# Patient Record
Sex: FEMALE | Race: WHITE | NOT HISPANIC OR LATINO | Employment: OTHER | URBAN - METROPOLITAN AREA
[De-identification: names, ages, dates, MRNs, and addresses within clinical notes are randomized per-mention and may not be internally consistent; named-entity substitution may affect disease eponyms.]

---

## 2017-02-06 ENCOUNTER — TRANSCRIBE ORDERS (OUTPATIENT)
Dept: ADMINISTRATIVE | Facility: HOSPITAL | Age: 52
End: 2017-02-06

## 2017-02-06 DIAGNOSIS — Z12.31 VISIT FOR SCREENING MAMMOGRAM: Primary | ICD-10-CM

## 2017-02-24 ENCOUNTER — HOSPITAL ENCOUNTER (OUTPATIENT)
Dept: MAMMOGRAPHY | Facility: HOSPITAL | Age: 52
Discharge: HOME | End: 2017-02-24
Admitting: INTERNAL MEDICINE

## 2017-02-24 ENCOUNTER — APPOINTMENT (OUTPATIENT)
Dept: MAMMOGRAPHY | Facility: HOSPITAL | Age: 52
End: 2017-02-24

## 2017-02-24 DIAGNOSIS — Z12.31 VISIT FOR SCREENING MAMMOGRAM: ICD-10-CM

## 2017-02-24 PROCEDURE — 77063 BREAST TOMOSYNTHESIS BI: CPT | Performed by: RADIOLOGY

## 2017-02-24 PROCEDURE — G0202 SCR MAMMO BI INCL CAD: HCPCS | Performed by: RADIOLOGY

## 2017-02-24 PROCEDURE — G0202 SCR MAMMO BI INCL CAD: HCPCS

## 2017-02-24 PROCEDURE — 77063 BREAST TOMOSYNTHESIS BI: CPT

## 2017-03-07 PROBLEM — Z01.419 WELL WOMAN EXAM WITH ROUTINE GYNECOLOGICAL EXAM: Chronic | Status: ACTIVE | Noted: 2017-03-07

## 2018-03-27 ENCOUNTER — TRANSCRIBE ORDERS (OUTPATIENT)
Dept: ADMINISTRATIVE | Facility: HOSPITAL | Age: 53
End: 2018-03-27

## 2018-03-27 DIAGNOSIS — N64.4 BREAST PAIN: ICD-10-CM

## 2018-03-27 DIAGNOSIS — N63.11 BREAST LUMP ON RIGHT SIDE AT 10 O'CLOCK POSITION: Primary | ICD-10-CM

## 2018-04-06 ENCOUNTER — HOSPITAL ENCOUNTER (OUTPATIENT)
Dept: ULTRASOUND IMAGING | Facility: HOSPITAL | Age: 53
Discharge: HOME | End: 2018-04-06

## 2018-04-06 ENCOUNTER — APPOINTMENT (OUTPATIENT)
Dept: MAMMOGRAPHY | Facility: HOSPITAL | Age: 53
End: 2018-04-06

## 2018-04-06 ENCOUNTER — HOSPITAL ENCOUNTER (OUTPATIENT)
Dept: MAMMOGRAPHY | Facility: HOSPITAL | Age: 53
Discharge: HOME | End: 2018-04-06
Admitting: INTERNAL MEDICINE

## 2018-04-06 DIAGNOSIS — N63.11 BREAST LUMP ON RIGHT SIDE AT 10 O'CLOCK POSITION: ICD-10-CM

## 2018-04-06 DIAGNOSIS — N64.4 BREAST PAIN: ICD-10-CM

## 2018-04-06 PROCEDURE — G0279 TOMOSYNTHESIS, MAMMO: HCPCS | Performed by: RADIOLOGY

## 2018-04-06 PROCEDURE — 77066 DX MAMMO INCL CAD BI: CPT

## 2018-04-06 PROCEDURE — 76642 ULTRASOUND BREAST LIMITED: CPT

## 2018-04-06 PROCEDURE — G0279 TOMOSYNTHESIS, MAMMO: HCPCS

## 2018-04-06 PROCEDURE — 76642 ULTRASOUND BREAST LIMITED: CPT | Performed by: RADIOLOGY

## 2018-04-06 PROCEDURE — 77066 DX MAMMO INCL CAD BI: CPT | Performed by: RADIOLOGY

## 2019-04-17 ENCOUNTER — TRANSCRIBE ORDERS (OUTPATIENT)
Dept: ADMINISTRATIVE | Facility: HOSPITAL | Age: 54
End: 2019-04-17

## 2019-04-17 DIAGNOSIS — R92.8 ABNORMAL MAMMOGRAM: Primary | ICD-10-CM

## 2019-07-31 ENCOUNTER — APPOINTMENT (OUTPATIENT)
Dept: MAMMOGRAPHY | Facility: HOSPITAL | Age: 54
End: 2019-07-31

## 2019-08-15 ENCOUNTER — APPOINTMENT (OUTPATIENT)
Dept: MAMMOGRAPHY | Facility: HOSPITAL | Age: 54
End: 2019-08-15

## 2019-10-21 ENCOUNTER — APPOINTMENT (OUTPATIENT)
Dept: MAMMOGRAPHY | Facility: HOSPITAL | Age: 54
End: 2019-10-21

## 2020-01-28 ENCOUNTER — APPOINTMENT (OUTPATIENT)
Dept: MAMMOGRAPHY | Facility: HOSPITAL | Age: 55
End: 2020-01-28

## 2020-04-07 ENCOUNTER — APPOINTMENT (OUTPATIENT)
Dept: MAMMOGRAPHY | Facility: HOSPITAL | Age: 55
End: 2020-04-07

## 2021-03-02 ENCOUNTER — TRANSCRIBE ORDERS (OUTPATIENT)
Dept: ADMINISTRATIVE | Facility: HOSPITAL | Age: 56
End: 2021-03-02

## 2021-03-02 DIAGNOSIS — R92.8 ABNORMAL MAMMOGRAM: Primary | ICD-10-CM

## 2021-03-02 DIAGNOSIS — Z12.31 ENCOUNTER FOR SCREENING MAMMOGRAM FOR MALIGNANT NEOPLASM OF BREAST: Primary | ICD-10-CM

## 2021-04-20 ENCOUNTER — HOSPITAL ENCOUNTER (OUTPATIENT)
Dept: MAMMOGRAPHY | Facility: HOSPITAL | Age: 56
End: 2021-04-20

## 2021-07-06 ENCOUNTER — APPOINTMENT (OUTPATIENT)
Dept: MAMMOGRAPHY | Facility: HOSPITAL | Age: 56
End: 2021-07-06

## 2021-07-24 ENCOUNTER — HOSPITAL ENCOUNTER (OUTPATIENT)
Dept: MAMMOGRAPHY | Facility: HOSPITAL | Age: 56
Discharge: HOME | End: 2021-07-24
Admitting: INTERNAL MEDICINE

## 2021-07-24 DIAGNOSIS — Z12.31 ENCOUNTER FOR SCREENING MAMMOGRAM FOR MALIGNANT NEOPLASM OF BREAST: ICD-10-CM

## 2021-07-24 PROCEDURE — 77063 BREAST TOMOSYNTHESIS BI: CPT | Performed by: RADIOLOGY

## 2021-07-24 PROCEDURE — 77067 SCR MAMMO BI INCL CAD: CPT | Performed by: RADIOLOGY

## 2021-07-24 PROCEDURE — 77067 SCR MAMMO BI INCL CAD: CPT

## 2021-07-24 PROCEDURE — 77063 BREAST TOMOSYNTHESIS BI: CPT

## 2021-07-29 ENCOUNTER — OFFICE VISIT (OUTPATIENT)
Dept: ORTHOPEDIC SURGERY | Facility: CLINIC | Age: 56
End: 2021-07-29

## 2021-07-29 VITALS
BODY MASS INDEX: 28.72 KG/M2 | HEART RATE: 62 BPM | DIASTOLIC BLOOD PRESSURE: 69 MMHG | HEIGHT: 67 IN | SYSTOLIC BLOOD PRESSURE: 143 MMHG | WEIGHT: 183 LBS

## 2021-07-29 DIAGNOSIS — R20.0 NUMBNESS AND TINGLING OF RIGHT UPPER EXTREMITY: Primary | ICD-10-CM

## 2021-07-29 DIAGNOSIS — R20.2 NUMBNESS AND TINGLING OF RIGHT UPPER EXTREMITY: Primary | ICD-10-CM

## 2021-07-29 DIAGNOSIS — Z77.22 SECONDHAND SMOKE EXPOSURE: ICD-10-CM

## 2021-07-29 DIAGNOSIS — Z98.890 HISTORY OF CARPAL TUNNEL RELEASE: ICD-10-CM

## 2021-07-29 DIAGNOSIS — M79.601 PAIN OF RIGHT UPPER EXTREMITY: ICD-10-CM

## 2021-07-29 DIAGNOSIS — E11.65 TYPE 2 DIABETES MELLITUS WITH HYPERGLYCEMIA, WITHOUT LONG-TERM CURRENT USE OF INSULIN (HCC): ICD-10-CM

## 2021-07-29 PROCEDURE — 99204 OFFICE O/P NEW MOD 45 MIN: CPT | Performed by: PHYSICIAN ASSISTANT

## 2021-07-29 RX ORDER — FENOFIBRATE 200 MG/1
145 CAPSULE ORAL
COMMUNITY
Start: 2021-07-02

## 2021-07-29 RX ORDER — FLUCONAZOLE 150 MG/1
TABLET ORAL
COMMUNITY
End: 2022-11-16 | Stop reason: HOSPADM

## 2021-07-29 RX ORDER — ALPRAZOLAM 1 MG/1
1 TABLET ORAL 3 TIMES DAILY PRN
COMMUNITY
Start: 2021-06-24

## 2021-07-29 RX ORDER — GABAPENTIN 800 MG/1
800 TABLET ORAL 3 TIMES DAILY
COMMUNITY
Start: 2021-06-29

## 2021-07-29 RX ORDER — TRAMADOL HYDROCHLORIDE 50 MG/1
50 TABLET ORAL EVERY 8 HOURS PRN
COMMUNITY
Start: 2021-06-24

## 2021-07-29 RX ORDER — GLIMEPIRIDE 1 MG/1
2 TABLET ORAL 2 TIMES DAILY
COMMUNITY
Start: 2021-05-15

## 2021-07-29 RX ORDER — FAMOTIDINE 40 MG/1
40 TABLET, FILM COATED ORAL 2 TIMES DAILY
COMMUNITY
Start: 2021-06-22

## 2021-07-29 RX ORDER — LOSARTAN POTASSIUM 100 MG/1
100 TABLET ORAL DAILY
COMMUNITY
Start: 2021-05-30

## 2021-07-29 RX ORDER — ROSUVASTATIN CALCIUM 5 MG/1
TABLET, COATED ORAL
COMMUNITY
Start: 2021-05-06

## 2021-07-29 RX ORDER — HYDROCODONE BITARTRATE AND ACETAMINOPHEN 10; 325 MG/1; MG/1
1 TABLET ORAL EVERY 8 HOURS PRN
COMMUNITY
Start: 2021-06-24
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
## 2022-08-09 ENCOUNTER — TRANSCRIBE ORDERS (OUTPATIENT)
Dept: ADMINISTRATIVE | Facility: HOSPITAL | Age: 57
End: 2022-08-09

## 2022-08-09 DIAGNOSIS — Z12.31 VISIT FOR SCREENING MAMMOGRAM: Primary | ICD-10-CM

## 2022-08-15 ENCOUNTER — APPOINTMENT (OUTPATIENT)
Dept: MAMMOGRAPHY | Facility: HOSPITAL | Age: 57
End: 2022-08-15
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
## 2022-11-16 ENCOUNTER — HOSPITAL ENCOUNTER (OUTPATIENT)
Facility: HOSPITAL | Age: 57
Setting detail: HOSPITAL OUTPATIENT SURGERY
Discharge: HOME | End: 2022-11-16
Attending: UROLOGY | Admitting: UROLOGY

## 2022-11-16 ENCOUNTER — ANESTHESIA (OUTPATIENT)
Dept: PERIOP | Facility: HOSPITAL | Age: 57
End: 2022-11-16

## 2022-11-16 ENCOUNTER — APPOINTMENT (OUTPATIENT)
Dept: GENERAL RADIOLOGY | Facility: HOSPITAL | Age: 57
End: 2022-11-16

## 2022-11-16 ENCOUNTER — ANESTHESIA EVENT (OUTPATIENT)
Dept: PERIOP | Facility: HOSPITAL | Age: 57
End: 2022-11-16

## 2022-11-16 VITALS
HEART RATE: 72 BPM | BODY MASS INDEX: 29.19 KG/M2 | OXYGEN SATURATION: 94 % | HEIGHT: 67 IN | SYSTOLIC BLOOD PRESSURE: 118 MMHG | RESPIRATION RATE: 16 BRPM | WEIGHT: 186 LBS | TEMPERATURE: 98.7 F | DIASTOLIC BLOOD PRESSURE: 67 MMHG

## 2022-11-16 DIAGNOSIS — R10.2 PELVIC PAIN IN FEMALE: ICD-10-CM

## 2022-11-16 LAB
ANION GAP SERPL CALCULATED.3IONS-SCNC: 11 MMOL/L (ref 5–15)
BUN SERPL-MCNC: 38 MG/DL (ref 6–20)
BUN/CREAT SERPL: 18.7 (ref 7–25)
CALCIUM SPEC-SCNC: 9.9 MG/DL (ref 8.6–10.5)
CHLORIDE SERPL-SCNC: 98 MMOL/L (ref 98–107)
CO2 SERPL-SCNC: 24 MMOL/L (ref 22–29)
CREAT SERPL-MCNC: 2.03 MG/DL (ref 0.57–1)
DEPRECATED RDW RBC AUTO: 60 FL (ref 37–54)
EGFRCR SERPLBLD CKD-EPI 2021: 28 ML/MIN/1.73
ERYTHROCYTE [DISTWIDTH] IN BLOOD BY AUTOMATED COUNT: 15.9 % (ref 12.3–15.4)
GLUCOSE BLDC GLUCOMTR-MCNC: 255 MG/DL (ref 70–130)
GLUCOSE SERPL-MCNC: 262 MG/DL (ref 65–99)
HCT VFR BLD AUTO: 38.5 % (ref 34–46.6)
HGB BLD-MCNC: 12.7 G/DL (ref 12–15.9)
MCH RBC QN AUTO: 34 PG (ref 26.6–33)
MCHC RBC AUTO-ENTMCNC: 33 G/DL (ref 31.5–35.7)
MCV RBC AUTO: 103.2 FL (ref 79–97)
PLATELET # BLD AUTO: 282 10*3/MM3 (ref 140–450)
PMV BLD AUTO: 10.1 FL (ref 6–12)
POTASSIUM SERPL-SCNC: 4.5 MMOL/L (ref 3.5–5.2)
RBC # BLD AUTO: 3.73 10*6/MM3 (ref 3.77–5.28)
SODIUM SERPL-SCNC: 133 MMOL/L (ref 136–145)
WBC NRBC COR # BLD: 12.01 10*3/MM3 (ref 3.4–10.8)

## 2022-11-16 PROCEDURE — 74420 UROGRAPHY RTRGR +-KUB: CPT

## 2022-11-16 PROCEDURE — 25010000002 CEFAZOLIN IN DEXTROSE 2-4 GM/100ML-% SOLUTION: Performed by: UROLOGY

## 2022-11-16 PROCEDURE — 25010000002 IOPAMIDOL 61 % SOLUTION: Performed by: UROLOGY

## 2022-11-16 PROCEDURE — 82962 GLUCOSE BLOOD TEST: CPT

## 2022-11-16 PROCEDURE — 25010000002 FENTANYL CITRATE (PF) 50 MCG/ML SOLUTION

## 2022-11-16 PROCEDURE — C1758 CATHETER, URETERAL: HCPCS | Performed by: UROLOGY

## 2022-11-16 PROCEDURE — C1769 GUIDE WIRE: HCPCS | Performed by: UROLOGY

## 2022-11-16 PROCEDURE — 87086 URINE CULTURE/COLONY COUNT: CPT | Performed by: UROLOGY

## 2022-11-16 PROCEDURE — 25010000002 PROPOFOL 10 MG/ML EMULSION: Performed by: NURSE ANESTHETIST, CERTIFIED REGISTERED

## 2022-11-16 PROCEDURE — 25010000002 DEXAMETHASONE PER 1 MG: Performed by: NURSE ANESTHETIST, CERTIFIED REGISTERED

## 2022-11-16 PROCEDURE — 25010000002 HYDROMORPHONE 1 MG/ML SOLUTION

## 2022-11-16 PROCEDURE — 25010000002 FENTANYL CITRATE (PF) 100 MCG/2ML SOLUTION: Performed by: NURSE ANESTHETIST, CERTIFIED REGISTERED

## 2022-11-16 PROCEDURE — 25010000002 ONDANSETRON PER 1 MG: Performed by: NURSE ANESTHETIST, CERTIFIED REGISTERED

## 2022-11-16 PROCEDURE — 85027 COMPLETE CBC AUTOMATED: CPT | Performed by: UROLOGY

## 2022-11-16 PROCEDURE — 88305 TISSUE EXAM BY PATHOLOGIST: CPT | Performed by: UROLOGY

## 2022-11-16 PROCEDURE — 80048 BASIC METABOLIC PNL TOTAL CA: CPT | Performed by: UROLOGY

## 2022-11-16 RX ORDER — CEPHALEXIN 250 MG/1
250 CAPSULE ORAL 3 TIMES DAILY
Qty: 15 CAPSULE | Refills: 0 | Status: SHIPPED | OUTPATIENT
Start: 2022-11-16 | End: 2022-11-21

## 2022-11-16 RX ORDER — GENTAMICIN SULFATE 80 MG/100ML
80 INJECTION, SOLUTION INTRAVENOUS ONCE
Status: DISCONTINUED | OUTPATIENT
Start: 2022-11-16 | End: 2022-11-16 | Stop reason: HOSPADM

## 2022-11-16 RX ORDER — HYDRALAZINE HYDROCHLORIDE 20 MG/ML
5 INJECTION INTRAMUSCULAR; INTRAVENOUS
Status: DISCONTINUED | OUTPATIENT
Start: 2022-11-16 | End: 2022-11-16 | Stop reason: HOSPADM

## 2022-11-16 RX ORDER — MIDAZOLAM HYDROCHLORIDE 1 MG/ML
1 INJECTION INTRAMUSCULAR; INTRAVENOUS
Status: DISCONTINUED | OUTPATIENT
Start: 2022-11-16 | End: 2022-11-16 | Stop reason: HOSPADM

## 2022-11-16 RX ORDER — SODIUM CHLORIDE, SODIUM LACTATE, POTASSIUM CHLORIDE, CALCIUM CHLORIDE 600; 310; 30; 20 MG/100ML; MG/100ML; MG/100ML; MG/100ML
9 INJECTION, SOLUTION INTRAVENOUS CONTINUOUS PRN
Status: DISCONTINUED | OUTPATIENT
Start: 2022-11-16 | End: 2022-11-16 | Stop reason: HOSPADM

## 2022-11-16 RX ORDER — DEXAMETHASONE SODIUM PHOSPHATE 4 MG/ML
INJECTION, SOLUTION INTRA-ARTICULAR; INTRALESIONAL; INTRAMUSCULAR; INTRAVENOUS; SOFT TISSUE AS NEEDED
Status: DISCONTINUED | OUTPATIENT
Start: 2022-11-16 | End: 2022-11-16 | Stop reason: SURG

## 2022-11-16 RX ORDER — SODIUM CHLORIDE 0.9 % (FLUSH) 0.9 %
10 SYRINGE (ML) INJECTION AS NEEDED
Status: DISCONTINUED | OUTPATIENT
Start: 2022-11-16 | End: 2022-11-16 | Stop reason: HOSPADM

## 2022-11-16 RX ORDER — HYDROCODONE BITARTRATE AND ACETAMINOPHEN 5; 325 MG/1; MG/1
1 TABLET ORAL ONCE AS NEEDED
Status: DISCONTINUED | OUTPATIENT
Start: 2022-11-16 | End: 2022-11-16 | Stop reason: HOSPADM

## 2022-11-16 RX ORDER — NALOXONE HCL 0.4 MG/ML
0.4 VIAL (ML) INJECTION AS NEEDED
Status: DISCONTINUED | OUTPATIENT
Start: 2022-11-16 | End: 2022-11-16 | Stop reason: HOSPADM

## 2022-11-16 RX ORDER — FENTANYL CITRATE 50 UG/ML
INJECTION, SOLUTION INTRAMUSCULAR; INTRAVENOUS
Status: COMPLETED
Start: 2022-11-16 | End: 2022-11-16

## 2022-11-16 RX ORDER — CEFAZOLIN SODIUM 2 G/100ML
2 INJECTION, SOLUTION INTRAVENOUS ONCE
Status: COMPLETED | OUTPATIENT
Start: 2022-11-16 | End: 2022-11-16

## 2022-11-16 RX ORDER — ONDANSETRON 2 MG/ML
4 INJECTION INTRAMUSCULAR; INTRAVENOUS ONCE AS NEEDED
Status: DISCONTINUED | OUTPATIENT
Start: 2022-11-16 | End: 2022-11-16 | Stop reason: HOSPADM

## 2022-11-16 RX ORDER — SODIUM CHLORIDE 0.9 % (FLUSH) 0.9 %
3 SYRINGE (ML) INJECTION EVERY 12 HOURS SCHEDULED
Status: DISCONTINUED | OUTPATIENT
Start: 2022-11-16 | End: 2022-11-16 | Stop reason: HOSPADM

## 2022-11-16 RX ORDER — MAGNESIUM HYDROXIDE 1200 MG/15ML
LIQUID ORAL AS NEEDED
Status: DISCONTINUED | OUTPATIENT
Start: 2022-11-16 | End: 2022-11-16 | Stop reason: HOSPADM

## 2022-11-16 RX ORDER — HYDROMORPHONE HYDROCHLORIDE 1 MG/ML
0.5 INJECTION, SOLUTION INTRAMUSCULAR; INTRAVENOUS; SUBCUTANEOUS
Status: DISCONTINUED | OUTPATIENT
Start: 2022-11-16 | End: 2022-11-16 | Stop reason: HOSPADM

## 2022-11-16 RX ORDER — PROMETHAZINE HYDROCHLORIDE 25 MG/1
25 SUPPOSITORY RECTAL ONCE AS NEEDED
Status: DISCONTINUED | OUTPATIENT
Start: 2022-11-16 | End: 2022-11-16 | Stop reason: HOSPADM

## 2022-11-16 RX ORDER — SODIUM CHLORIDE 0.9 % (FLUSH) 0.9 %
10 SYRINGE (ML) INJECTION EVERY 12 HOURS SCHEDULED
Status: DISCONTINUED | OUTPATIENT
Start: 2022-11-16 | End: 2022-11-16 | Stop reason: HOSPADM

## 2022-11-16 RX ORDER — LIDOCAINE HYDROCHLORIDE 20 MG/ML
JELLY TOPICAL AS NEEDED
Status: DISCONTINUED | OUTPATIENT
Start: 2022-11-16 | End: 2022-11-16 | Stop reason: HOSPADM

## 2022-11-16 RX ORDER — PROPOFOL 10 MG/ML
VIAL (ML) INTRAVENOUS AS NEEDED
Status: DISCONTINUED | OUTPATIENT
Start: 2022-11-16 | End: 2022-11-16 | Stop reason: SURG

## 2022-11-16 RX ORDER — SODIUM CHLORIDE 0.9 % (FLUSH) 0.9 %
3-10 SYRINGE (ML) INJECTION AS NEEDED
Status: DISCONTINUED | OUTPATIENT
Start: 2022-11-16 | End: 2022-11-16 | Stop reason: HOSPADM

## 2022-11-16 RX ORDER — FENTANYL CITRATE 50 UG/ML
INJECTION, SOLUTION INTRAMUSCULAR; INTRAVENOUS AS NEEDED
Status: DISCONTINUED | OUTPATIENT
Start: 2022-11-16 | End: 2022-11-16 | Stop reason: SURG

## 2022-11-16 RX ORDER — IPRATROPIUM BROMIDE AND ALBUTEROL SULFATE 2.5; .5 MG/3ML; MG/3ML
3 SOLUTION RESPIRATORY (INHALATION) ONCE AS NEEDED
Status: DISCONTINUED | OUTPATIENT
Start: 2022-11-16 | End: 2022-11-16 | Stop reason: HOSPADM

## 2022-11-16 RX ORDER — DROPERIDOL 2.5 MG/ML
0.62 INJECTION, SOLUTION INTRAMUSCULAR; INTRAVENOUS ONCE AS NEEDED
Status: DISCONTINUED | OUTPATIENT
Start: 2022-11-16 | End: 2022-11-16 | Stop reason: HOSPADM

## 2022-11-16 RX ORDER — PHENYLEPHRINE HCL IN 0.9% NACL 1 MG/10 ML
SYRINGE (ML) INTRAVENOUS AS NEEDED
Status: DISCONTINUED | OUTPATIENT
Start: 2022-11-16 | End: 2022-11-16 | Stop reason: SURG

## 2022-11-16 RX ORDER — DROPERIDOL 2.5 MG/ML
0.62 INJECTION, SOLUTION INTRAMUSCULAR; INTRAVENOUS
Status: DISCONTINUED | OUTPATIENT
Start: 2022-11-16 | End: 2022-11-16 | Stop reason: HOSPADM

## 2022-11-16 RX ORDER — FENTANYL CITRATE 50 UG/ML
50 INJECTION, SOLUTION INTRAMUSCULAR; INTRAVENOUS
Status: DISCONTINUED | OUTPATIENT
Start: 2022-11-16 | End: 2022-11-16 | Stop reason: HOSPADM

## 2022-11-16 RX ORDER — LIDOCAINE HYDROCHLORIDE 10 MG/ML
INJECTION, SOLUTION EPIDURAL; INFILTRATION; INTRACAUDAL; PERINEURAL AS NEEDED
Status: DISCONTINUED | OUTPATIENT
Start: 2022-11-16 | End: 2022-11-16 | Stop reason: SURG

## 2022-11-16 RX ORDER — EPHEDRINE SULFATE 50 MG/ML
INJECTION INTRAVENOUS AS NEEDED
Status: DISCONTINUED | OUTPATIENT
Start: 2022-11-16 | End: 2022-11-16 | Stop reason: SURG

## 2022-11-16 RX ORDER — ONDANSETRON 2 MG/ML
INJECTION INTRAMUSCULAR; INTRAVENOUS AS NEEDED
Status: DISCONTINUED | OUTPATIENT
Start: 2022-11-16 | End: 2022-11-16 | Stop reason: SURG

## 2022-11-16 RX ORDER — FAMOTIDINE 20 MG/1
20 TABLET, FILM COATED ORAL
Status: COMPLETED | OUTPATIENT
Start: 2022-11-16 | End: 2022-11-16

## 2022-11-16 RX ORDER — LIDOCAINE HYDROCHLORIDE 10 MG/ML
0.5 INJECTION, SOLUTION EPIDURAL; INFILTRATION; INTRACAUDAL; PERINEURAL ONCE AS NEEDED
Status: COMPLETED | OUTPATIENT
Start: 2022-11-16 | End: 2022-11-16

## 2022-11-16 RX ORDER — PROMETHAZINE HYDROCHLORIDE 25 MG/1
25 TABLET ORAL ONCE AS NEEDED
Status: DISCONTINUED | OUTPATIENT
Start: 2022-11-16 | End: 2022-11-16 | Stop reason: HOSPADM

## 2022-11-16 RX ORDER — LABETALOL HYDROCHLORIDE 5 MG/ML
5 INJECTION, SOLUTION INTRAVENOUS
Status: DISCONTINUED | OUTPATIENT
Start: 2022-11-16 | End: 2022-11-16 | Stop reason: HOSPADM

## 2022-11-16 RX ADMIN — FENTANYL CITRATE 50 MCG: 50 INJECTION, SOLUTION INTRAMUSCULAR; INTRAVENOUS at 12:25

## 2022-11-16 RX ADMIN — EPHEDRINE SULFATE 10 MG: 50 INJECTION INTRAVENOUS at 11:56

## 2022-11-16 RX ADMIN — DEXAMETHASONE SODIUM PHOSPHATE 8 MG: 4 INJECTION, SOLUTION INTRAMUSCULAR; INTRAVENOUS at 11:21

## 2022-11-16 RX ADMIN — FENTANYL CITRATE 50 MCG: 50 INJECTION, SOLUTION INTRAMUSCULAR; INTRAVENOUS at 12:33

## 2022-11-16 RX ADMIN — Medication 100 MCG: at 11:25

## 2022-11-16 RX ADMIN — LIDOCAINE HYDROCHLORIDE 40 MG: 10 INJECTION, SOLUTION EPIDURAL; INFILTRATION; INTRACAUDAL; PERINEURAL at 11:18

## 2022-11-16 RX ADMIN — LIDOCAINE HYDROCHLORIDE 0.5 ML: 10 INJECTION, SOLUTION EPIDURAL; INFILTRATION; INTRACAUDAL; PERINEURAL at 09:25

## 2022-11-16 RX ADMIN — HYDROMORPHONE HYDROCHLORIDE 0.5 MG: 1 INJECTION, SOLUTION INTRAMUSCULAR; INTRAVENOUS; SUBCUTANEOUS at 12:48

## 2022-11-16 RX ADMIN — FENTANYL CITRATE 100 MCG: 50 INJECTION, SOLUTION INTRAMUSCULAR; INTRAVENOUS at 11:18

## 2022-11-16 RX ADMIN — FAMOTIDINE 20 MG: 20 TABLET, FILM COATED ORAL at 09:25

## 2022-11-16 RX ADMIN — CEFAZOLIN SODIUM 2 G: 2 INJECTION, SOLUTION INTRAVENOUS at 10:18

## 2022-11-16 RX ADMIN — ONDANSETRON 4 MG: 2 INJECTION INTRAMUSCULAR; INTRAVENOUS at 11:43

## 2022-11-16 RX ADMIN — EPHEDRINE SULFATE 10 MG: 50 INJECTION INTRAVENOUS at 11:48

## 2022-11-16 RX ADMIN — PROPOFOL 250 MG: 10 INJECTION, EMULSION INTRAVENOUS at 11:18

## 2022-11-16 RX ADMIN — SODIUM CHLORIDE, POTASSIUM CHLORIDE, SODIUM LACTATE AND CALCIUM CHLORIDE 9 ML/HR: 600; 310; 30; 20 INJECTION, SOLUTION INTRAVENOUS at 09:25

## 2022-11-16 NOTE — ANESTHESIA PROCEDURE NOTES
Airway  Urgency: elective    Date/Time: 11/16/2022 11:19 AM  Airway not difficult    General Information and Staff    Patient location during procedure: OR  Anesthesiologist: Sagar Alvarez MD  CRNA/CAA: Telma Mullen CRNA  SRNA: Aga Figueroa SRNA  Indications and Patient Condition  Indications for airway management: airway protection    Preoxygenated: yes  MILS maintained throughout  Mask difficulty assessment: 0 - not attempted    Final Airway Details  Final airway type: supraglottic airway      Successful airway: I-gel  Size 4     Assessment: lips, teeth, and gum same as pre-op and atraumatic intubation

## 2022-11-16 NOTE — ANESTHESIA POSTPROCEDURE EVALUATION
Patient: Shawanda Jeronimo    Procedure Summary     Date: 11/16/22 Room / Location:  [facility] /  [facility]    Anesthesia Start: 1110 Anesthesia Stop: 1157    Procedure: CYSTOSCOPY, RETROGRADE PYLEOGRAMS WITH BLADDER BIOPSY (Bladder) Diagnosis:     Surgeons: Anibal Thomas MD Provider: Sagar Alvarez MD    Anesthesia Type: general ASA Status: 3          Anesthesia Type: general    Vitals  Vitals Value Taken Time   BP 89/49 11/16/22 1154   Temp 97.7    Pulse 69 11/16/22 1156   Resp 12    SpO2 100 % 11/16/22 1156   Vitals shown include unvalidated device data.        Post Anesthesia Care and Evaluation    Patient location during evaluation: PACU  Patient participation: waiting for patient participation  Level of consciousness: sleepy but conscious  Pain management: adequate    Airway patency: patent  Anesthetic complications: No anesthetic complications  PONV Status: none  Cardiovascular status: hemodynamically stable and acceptable  Respiratory status: nonlabored ventilation, acceptable, nasal cannula and oral airway  Hydration status: acceptable

## 2022-11-16 NOTE — OP NOTE
Prep diagnosis: Female pelvic pain  Postoperative gnosis: Same  Procedure performed: Cystoscopy bilateral retrograde pyelograms and bladder biopsy and fulguration  Anesthesia General  Surgeon: William  Indications: This a 57-year-old white female who has female pelvic pain that is ill-defined.  She has had an occasional urinary tract infection but not recurrent.  Imaging has been unrevealing.  GYN exam has been unrevealing and she has been referred to pain specialist.  Operative description: Patient was placed operative table in the dorsolithotomy position.  General tracheal anesthesia was administered.  Her groin was prepped and taken usual sterile fashion.  The 22 [place] Stortz panendoscope was inserted into video cystoscopy.  The urethra was inspected and noted be normal.  The bladder was entered and some cloudy urine was drained out.  I then inspected the mucosa.  The orifices were in their normal location and effluxing clear urine.  The left base of the bladder and left sidewall the bladder had some erythema but there was no obvious tumors and there was no obvious fistulous tract and no drainage of any feculent material noted.  I inspected the bladder circumferentially with a 30 and 70 degree lenses.  I then cannulated each orifice with a 5 Pollick and performed bilateral retrograde pyelograms.  These were normal with no filling defects tumors stones or hydronephrosis.  I then introduced the cold cup biopsy and took 2 biopsies from the left side of the bladder that were erythematous.  I cauterized those biopsy sites with the Bugbee electrode.  The bladder was drained the scope was removed atraumatically vaginoscopy shows a normal blind-ending vagina.  2% Xylocaine gel was injected in the bladder and she was awakened and taken the recovery room in stable condition.  There were no complications.

## 2022-11-16 NOTE — BRIEF OP NOTE
CYSTOSCOPY  Progress Note    Shawanda Jeronimo  11/16/2022    Pre-op Diagnosis:   Female pelvic pain       Post-Op Diagnosis Codes:  Same    Procedure/CPT® Codes:        Procedure(s):  CYSTOSCOPY, bilateral RETROGRADE PYLEOGRAMS WITH BLADDER BIOPSY and fulguration        Surgeon(s):  Anibal Thomas MD    Anesthesia: Choice    Staff:   Circulator: Jennifer Manjarrez RN; Samra Candelario RN  Radiology Technologist: Floridalma Way RT  Scrub Person: Mari Meeks RN         Estimated Blood Loss: none    Urine Voided: * No values recorded between 11/16/2022 11:09 AM and 11/16/2022 11:52 AM *    Specimens:                Specimens     ID Source Type Tests Collected By Collected At Frozen?    1 Urine, Catheter Urine · URINE CULTURE   Anibal Thomas MD 11/16/22 1151     Description: URINE FOR CULTURE    Comment: URINE FOR CULTURE    This specimen was not marked as sent.    A Urinary Bladder Tissue · TISSUE PATHOLOGY EXAM   Anibal Thomas MD 11/16/22 1142 No    Description: URINARY BLADDER BIOPSY FOR PERMANENT    Comment: URINARY BLADDER BIOPSY FOR PERMANENT                Drains: * No LDAs found *    Findings: Normal retrogrades.  Normal vagina.  Bladder with some lateral left side erythema        Complications: None, to recovery room stable          Anibal Thomas MD     Date: 11/16/2022  Time: 12:04 EST

## 2022-11-16 NOTE — ANESTHESIA PREPROCEDURE EVALUATION
Anesthesia Evaluation     Patient summary reviewed and Nursing notes reviewed   history of anesthetic complications: PONV  NPO Solid Status: > 8 hours  NPO Liquid Status: > 2 hours           Airway   Mallampati: II  TM distance: >3 FB  Neck ROM: full  Possible difficult intubation  Dental    (+) poor dentition    Pulmonary     breath sounds clear to auscultation  (+) COPD mild,   Cardiovascular   Exercise tolerance: good (4-7 METS)    ECG reviewed  Rhythm: regular  Rate: normal    (+) hypertension well controlled less than 2 medications, hyperlipidemia,       Neuro/Psych  (+) headaches,    GI/Hepatic/Renal/Endo    (+)   diabetes mellitus type 2 poorly controlled,     Musculoskeletal     Abdominal   (+) obese,     Abdomen: soft.   Substance History      OB/GYN          Other   arthritis,                    Anesthesia Plan    ASA 3     general     intravenous induction     Anesthetic plan, risks, benefits, and alternatives have been provided, discussed and informed consent has been obtained with: patient.    Plan discussed with CRNA.        CODE STATUS:

## 2022-11-17 LAB
CYTO UR: NORMAL
LAB AP CASE REPORT: NORMAL
LAB AP CLINICAL INFORMATION: NORMAL
PATH REPORT.FINAL DX SPEC: NORMAL
PATH REPORT.GROSS SPEC: NORMAL

## 2022-11-18 LAB — BACTERIA SPEC AEROBE CULT: NO GROWTH

## 2023-11-13 ENCOUNTER — TRANSCRIBE ORDERS (OUTPATIENT)
Dept: ADMINISTRATIVE | Facility: HOSPITAL | Age: 58
End: 2023-11-13
Payer: MEDICARE

## 2023-11-13 DIAGNOSIS — Z12.31 VISIT FOR SCREENING MAMMOGRAM: Primary | ICD-10-CM

## 2024-01-04 ENCOUNTER — HOSPITAL ENCOUNTER (OUTPATIENT)
Dept: MAMMOGRAPHY | Facility: HOSPITAL | Age: 59
Discharge: HOME | End: 2024-01-04
Admitting: INTERNAL MEDICINE
Payer: MEDICARE

## 2024-01-04 DIAGNOSIS — Z12.31 VISIT FOR SCREENING MAMMOGRAM: ICD-10-CM

## 2024-01-04 PROCEDURE — 77063 BREAST TOMOSYNTHESIS BI: CPT

## 2024-01-04 PROCEDURE — 77067 SCR MAMMO BI INCL CAD: CPT

## 2024-01-18 ENCOUNTER — OFFICE VISIT (OUTPATIENT)
Dept: OBSTETRICS AND GYNECOLOGY | Facility: CLINIC | Age: 59
End: 2024-01-18
Payer: MEDICARE

## 2024-01-18 VITALS
HEIGHT: 67 IN | DIASTOLIC BLOOD PRESSURE: 60 MMHG | BODY MASS INDEX: 27.72 KG/M2 | WEIGHT: 176.6 LBS | SYSTOLIC BLOOD PRESSURE: 116 MMHG

## 2024-01-18 DIAGNOSIS — Z01.419 WELL WOMAN EXAM WITH ROUTINE GYNECOLOGICAL EXAM: Primary | ICD-10-CM

## 2024-01-18 DIAGNOSIS — N89.8 VAGINAL IRRITATION: ICD-10-CM

## 2024-01-18 LAB — HOLD SPECIMEN: NORMAL

## 2024-01-18 PROCEDURE — 81001 URINALYSIS AUTO W/SCOPE: CPT | Performed by: NURSE PRACTITIONER

## 2024-01-18 PROCEDURE — 87086 URINE CULTURE/COLONY COUNT: CPT | Performed by: NURSE PRACTITIONER

## 2024-01-18 RX ORDER — PEN NEEDLE, DIABETIC 31 GX5/16"
NEEDLE, DISPOSABLE MISCELLANEOUS
COMMUNITY

## 2024-01-18 RX ORDER — ESTRADIOL 0.1 MG/G
CREAM VAGINAL
Qty: 1 EACH | Refills: 12 | Status: SHIPPED | OUTPATIENT
Start: 2024-01-18

## 2024-01-18 NOTE — PROGRESS NOTES
Subjective   Chief Complaint   Patient presents with    Annual Exam     Well woman exam, Hysterectomy due to cancer findings and is having some spotting , mammo on 1/4/24  was normal.    Vaginal Discharge     Vaginal burning for several months     Shawanda Jeronimo is a 58 y.o. year old GoPO who is post-menopausal.  She is S/P hysterectomy presenting to be seen for her annual exam.  She states she had a hysterectomy 30 years ago, she is unsure what type of cancer she had. This past year she has not been on hormone replacement therapy.  There has been vaginal bleeding in the last 12 months.  She states bleeding is sporadic and has happened three times in last year.She is unsure if this is urinary related or vaginal. She has had a cystoscopy with bladder biopsy December 2022 that showed ulcerated bladder with granulation tissue. She has not followed up with urology since.  Menopausal symptoms are not present.   She is due for screening colonoscopy, will schedule when she has transportation, she has active order.   She had a BI-RADS 1 mammogram earlier this month.    SEXUAL Hx:  She is not currently sexually active.  In the past year there there has been NO new sexual partners.    Condoms are not needed because she is not sexually active.  She would not like to be screened for STD's at today's exam.  [place] is painful: not asked  HEALTH Hx:  She exercises regularly: no (and has no plans to become more active).  She wears her seat belt: yes.  She has concerns about domestic violence: no.  OTHER THINGS SHE WANTS TO DISCUSS TODAY:  Nothing else    The following portions of the patient's history were reviewed and updated as appropriate:problem list, current medications, allergies, past family history, past medical history, past social history, and past surgical history.    Social History    Tobacco Use      Smoking status: Never      Smokeless tobacco: Never    Review of Systems  Constitutional POS: nothing reported     "NEG: anorexia or night sweats   Genitourinary POS: MOLLY is present and it IS effecting her ADL's    NEG: dysuria or hematuria   Gastointestinal POS: nothing reported    NEG: bloating, change in bowel habits, melena, or reflux symptoms   Integument POS: nothing reported    NEG: moles that are changing in size, shape, color or rashes   Breast POS: nothing reported    NEG: persistent breast lump, skin dimpling, or nipple discharge        Objective   /60 (BP Location: Left arm, Patient Position: Sitting, Cuff Size: Adult)   Ht 170.2 cm (67\")   Wt 80.1 kg (176 lb 9.6 oz)   BMI 27.66 kg/m²     General:  well developed; well nourished  no acute distress  obese - Body mass index is 27.66 kg/m².   Skin:  No suspicious lesions seen   Thyroid: normal to inspection and palpation   Breasts:  Examined in supine position  Symmetric without masses or skin dimpling  Nipples normal without inversion, lesions or discharge  There are no palpable axillary nodes   Abdomen: soft, non-tender; no masses  no umbilical or inguinal hernias are present  no hepato-splenomegaly   Pelvis: Clinical staff was present for exam  External genitalia:  normal appearance of the external genitalia including Bartholin's and Imbery's glands.  :  urethral meatus normal;  Vaginal:  atrophic mucosal changes are present;  Cervix:  absent.  Uterus:  absent.  Adnexa:  non palpable bilaterally.  Rectal:  digital rectal exam not performed; anus visually normal appearing.        Assessment   Well woman with routine gynecological exam  Status post hysterectomy for unknown certain etiology will request previous records  She is up to date on all relevant gynecologic and colorectal screenings except colon cancer screening  Dysuria  Vaginal atrophy     Plan   Pap was performed to the vaginal cuff.  Will review hysterectomy records when available and determine if she needs follow-up Paps in the future based on today's Pap results and her operative note.  She was " encouraged to get yearly mammograms.  She should report any palpable breast lump(s) or skin changes regardless of mammographic findings.  I explained to Shaawnda that notification regarding her mammogram results will come from the center performing the study.  Our office will not be routinely calling with mammogram results.  It is her responsibility to make sure that the results from the mammogram are communicated to her by the breast center.  If she has any questions about the results, she is welcome to call our office anytime.  Plan DEXA scan next year.  Today I discussed with Shawanda the total recommended calcium intake for a post-menopausal female is 1200 mg.  Ideally this should be from dietary sources.  I reviewed calcium content in various foods including milk, fortified orange juice and yogurt.  If she cannot get sufficient calcium through dietary means, it is recommended to supplement with either a multivitamin or calcium to reach her daily goal.  I also reviewed the difference in the bioavailability of calcium carbonate and calcium citrate containing supplements and the importance of taking calcium carbonate containing products with food. Finally, vitamin D's role in calcium absorption was reviewed and a total daily vitamin D intake of 600 units was recommended.  Discussed vaginal symptoms.  Will start Estrace cream.  Urine sent for urine culture and 1 swab sent for bacterial vaginosis and yeast testing.  The importance of keeping all planned follow-up and taking all medications as prescribed was emphasized.  Follow up for annual exam 1 year    No orders of the defined types were placed in this encounter.         This note was electronically signed.  Mere Patel, MADHU  January 18, 2024

## 2024-01-19 LAB

## 2024-01-19 RX ORDER — NITROFURANTOIN 25; 75 MG/1; MG/1
100 CAPSULE ORAL 2 TIMES DAILY
Qty: 6 CAPSULE | Refills: 0 | Status: SHIPPED | OUTPATIENT
Start: 2024-01-19

## 2024-01-20 LAB — BACTERIA SPEC AEROBE CULT: NO GROWTH

## 2024-01-22 ENCOUNTER — TELEPHONE (OUTPATIENT)
Dept: OBSTETRICS AND GYNECOLOGY | Facility: CLINIC | Age: 59
End: 2024-01-22
Payer: MEDICARE

## 2024-01-22 LAB — REF LAB TEST METHOD: NORMAL

## 2024-01-22 NOTE — TELEPHONE ENCOUNTER
"MADHU Donahue advised: \"For the first week she needs to use nightly and then the next week every other night until she gets down to maintenance dose of 1-3 times a week.  If when she decreases the frequency she has increased symptoms of discomfort she can increase the use to up to daily as needed.  MADHU Donahue \"    Called and spoke with patient and informed her of MADHU Donahue's advisement. Patient verified understanding and was appreciative of information.   "

## 2024-01-22 NOTE — TELEPHONE ENCOUNTER
SHAYNA    Received a call from the patient in regards to the Estradiol prescription sent to her pharmacy. Per patient, she could not recall for how long she needs to use the cream. Looked over office visit note from her visit on 1/18/24 and medication, however, I was unable to see where it indicated the length of time patient should take. If someone could please follow up with the patient, it would be appreciated.     Thank you kindly.

## 2024-01-22 NOTE — TELEPHONE ENCOUNTER
"Called and spoke with patient - Rx is written in with the instructions of \"Insert 1 gm intravaginally 1-3 times each week\".  Patient would like to know from Mere what her specific instructions were, she states she recalls that she was supposed to do a certain amount for a certain amount of weeks, etc, but cannot remember the specifications.   "

## 2024-01-24 ENCOUNTER — TELEPHONE (OUTPATIENT)
Dept: OBSTETRICS AND GYNECOLOGY | Facility: CLINIC | Age: 59
End: 2024-01-24
Payer: MEDICARE

## 2024-01-24 DIAGNOSIS — N89.8 VAGINAL IRRITATION: Primary | ICD-10-CM

## 2024-01-24 RX ORDER — BORIC ACID
600 POWDER (GRAM) MISCELLANEOUS NIGHTLY
Qty: 21 SUPPOSITORY | Refills: 0 | Status: SHIPPED | OUTPATIENT
Start: 2024-01-24

## 2024-01-24 RX ORDER — BORIC ACID
600 POWDER (GRAM) MISCELLANEOUS NIGHTLY
Qty: 21 SUPPOSITORY | Refills: 0 | Status: SHIPPED | OUTPATIENT
Start: 2024-01-24 | End: 2024-01-24 | Stop reason: SDUPTHER

## 2024-01-24 NOTE — TELEPHONE ENCOUNTER
Called and spoke with pharmacist at [facility] - they confirmed that they do not keep it in stock but they could order it and likely have it in a day or two.  I spoke with patient and informed her of this update, she confirmed this little bit of a possible wait was fine and she would like it sent to [facility].  We also discussed that if a PA is needed the pharmacy would typically send that request over to us and we would complete that accordingly.  Sent same Rx that was sent earlier over to [facility] per patient request.

## 2024-01-24 NOTE — TELEPHONE ENCOUNTER
SHAYNA    Patient called inquiring in regards to script for Boric Acid suppository script. Patient informed sent to [facility]. However, she is requesting it to be sent to her pharmacy:    Confirmed: [facility] [id number] - [place] [address] [address] AT [facility] - [phone] [facility] [phone] FX (Pharmacy)

## 2024-01-24 NOTE — TELEPHONE ENCOUNTER
Boric Acid suppository     Patient called about the above medication. She stated that she got a message from the office saying we had prescribed this medication and sent it to a pharmacy that she had never heard of and had no clue as to where it was.  She was asking if it was an option to have this sent to her regular pharmacy at [facility] for her to .  She also mentioned that if her insurance didn't cover the cost of the medication she would have to have us do a PA, prescribe something comparable or she would not be able to purchase it.   If there are any questions or concerns about this matter she is available for a call back.

## 2024-01-24 NOTE — TELEPHONE ENCOUNTER
Again, attempted to reach pharmacy, no answer, unable to reach anyone to speak to about if this prescription would be possible at the pharmacy.

## 2024-01-24 NOTE — TELEPHONE ENCOUNTER
Attempted to call pharmacy ([facility]) to confirm/inquire re: if this Rx could be filled there.  No answer, pharmacy on lunch break.

## 2024-01-29 ENCOUNTER — TELEPHONE (OUTPATIENT)
Dept: OBSTETRICS AND GYNECOLOGY | Facility: CLINIC | Age: 59
End: 2024-01-29
Payer: MEDICARE

## 2024-01-29 NOTE — TELEPHONE ENCOUNTER
SHAYNA      Patient called saying suppositories that were prescribed are not working so far, they are making her very uncomfortable.  She asked if she could get something for the pain until they start working the way they are supposed to or if you have any suggestions on making it feel better.  Please advise.

## 2024-01-29 NOTE — TELEPHONE ENCOUNTER
Per Mere, I called the patient let her know that Mere stated that they may be uncomfortable at first. However there is nothing we can send in to help with pain. Patient verbal understood.

## 2024-02-14 ENCOUNTER — TELEPHONE (OUTPATIENT)
Dept: OBSTETRICS AND GYNECOLOGY | Facility: CLINIC | Age: 59
End: 2024-02-14
Payer: MEDICARE

## 2024-02-14 NOTE — TELEPHONE ENCOUNTER
Pt stated that this pain in her vagina area has been going on for a year, but theses past 3 days it has gotten worst, it hurts to sit ,and walk per Pt. Please advise.

## 2024-02-14 NOTE — TELEPHONE ENCOUNTER
SHAYNA      Patient called stating that she is having pain in her vagina.  She hasn't fallen or done anything to cause the pain. Patient says it hurts when she is just sitting or walking.  Patient is wondering what you may suggest is wrong.  Please advise    Best call back number is [phone]    Ph[facility]  [facility]  [address]  PHONE [phone]  FAX       [phone]

## 2024-02-15 ENCOUNTER — TELEPHONE (OUTPATIENT)
Dept: OBSTETRICS AND GYNECOLOGY | Facility: CLINIC | Age: 59
End: 2024-02-15
Payer: MEDICARE

## 2024-02-15 RX ORDER — FLUCONAZOLE 150 MG/1
TABLET ORAL
Qty: 2 TABLET | Refills: 0 | Status: SHIPPED | OUTPATIENT
Start: 2024-02-15

## 2024-02-22 ENCOUNTER — TELEPHONE (OUTPATIENT)
Dept: OBSTETRICS AND GYNECOLOGY | Facility: CLINIC | Age: 59
End: 2024-02-22
Payer: MEDICARE

## 2024-02-22 NOTE — TELEPHONE ENCOUNTER
Called and spoke with patient, she has been scheduled for appt with Mere Patel for further assessment as recommended by provider.

## 2024-02-22 NOTE — TELEPHONE ENCOUNTER
Called and spoke with patient, she states that she originally had come into the clinic in regards to this pain, but was then diagnosed with the yeast infection.  She states she is not having any yeast infection symptoms - no vaginal itching/irritation, no vaginal discharge or odor.  She states last night the pain she has been having on and off for some time now (up to a year), has gotten so much worse and she cannot bear it.  Routing to provider for advisement.

## 2024-02-22 NOTE — TELEPHONE ENCOUNTER
SHAYNA    Received call from patient stating she just finished second pill on Monday for yeast infection. However, pain she is experiencing at the opening of her vaginal area and clitoris, is unbearable. Per patient she can barely stand, sit or walk.

## 2024-02-23 ENCOUNTER — OFFICE VISIT (OUTPATIENT)
Dept: OBSTETRICS AND GYNECOLOGY | Facility: CLINIC | Age: 59
End: 2024-02-23
Payer: MEDICARE

## 2024-02-23 ENCOUNTER — TELEPHONE (OUTPATIENT)
Dept: OBSTETRICS AND GYNECOLOGY | Facility: CLINIC | Age: 59
End: 2024-02-23

## 2024-02-23 VITALS
BODY MASS INDEX: 27.88 KG/M2 | HEIGHT: 67 IN | WEIGHT: 177.6 LBS | SYSTOLIC BLOOD PRESSURE: 130 MMHG | DIASTOLIC BLOOD PRESSURE: 80 MMHG

## 2024-02-23 DIAGNOSIS — N89.8 VAGINAL IRRITATION: ICD-10-CM

## 2024-02-23 DIAGNOSIS — R30.0 DYSURIA: Primary | ICD-10-CM

## 2024-02-23 LAB
BACTERIA UR QL AUTO: ABNORMAL /HPF
BILIRUB UR QL STRIP: NEGATIVE
CLARITY UR: CLEAR
COLOR UR: YELLOW
GLUCOSE UR STRIP-MCNC: NEGATIVE MG/DL
HGB UR QL STRIP.AUTO: ABNORMAL
HOLD SPECIMEN: NORMAL
HYALINE CASTS UR QL AUTO: ABNORMAL /LPF
KETONES UR QL STRIP: NEGATIVE
LEUKOCYTE ESTERASE UR QL STRIP.AUTO: ABNORMAL
NITRITE UR QL STRIP: NEGATIVE
PH UR STRIP.AUTO: 6 [PH] (ref 5–8)
PROT UR QL STRIP: ABNORMAL
RBC # UR STRIP: ABNORMAL /HPF
REF LAB TEST METHOD: ABNORMAL
SP GR UR STRIP: 1.01 (ref 1–1.03)
SQUAMOUS #/AREA URNS HPF: ABNORMAL /HPF
UROBILINOGEN UR QL STRIP: ABNORMAL
WBC # UR STRIP: ABNORMAL /HPF

## 2024-02-23 PROCEDURE — 81001 URINALYSIS AUTO W/SCOPE: CPT | Performed by: NURSE PRACTITIONER

## 2024-02-23 PROCEDURE — 87077 CULTURE AEROBIC IDENTIFY: CPT | Performed by: NURSE PRACTITIONER

## 2024-02-23 PROCEDURE — 87186 SC STD MICRODIL/AGAR DIL: CPT | Performed by: NURSE PRACTITIONER

## 2024-02-23 PROCEDURE — 87086 URINE CULTURE/COLONY COUNT: CPT | Performed by: NURSE PRACTITIONER

## 2024-02-23 RX ORDER — PHENAZOPYRIDINE HYDROCHLORIDE 200 MG/1
200 TABLET, FILM COATED ORAL 3 TIMES DAILY PRN
Qty: 9 TABLET | Refills: 0 | Status: SHIPPED | OUTPATIENT
Start: 2024-02-23 | End: 2024-02-23 | Stop reason: SDUPTHER

## 2024-02-23 RX ORDER — PHENAZOPYRIDINE HYDROCHLORIDE 200 MG/1
200 TABLET, FILM COATED ORAL 3 TIMES DAILY PRN
Qty: 9 TABLET | Refills: 0 | Status: SHIPPED | OUTPATIENT
Start: 2024-02-23 | End: 2024-02-26

## 2024-02-23 RX ORDER — NITROFURANTOIN 25; 75 MG/1; MG/1
100 CAPSULE ORAL 2 TIMES DAILY
Qty: 6 CAPSULE | Refills: 0 | Status: SHIPPED | OUTPATIENT
Start: 2024-02-23 | End: 2024-02-26

## 2024-02-23 NOTE — TELEPHONE ENCOUNTER
I called and told pt her insurance would not  cover phenazopyridine. Mere sent a it to [facility] on [address] with all the information for her to use [facility] to get it for about $4.00.Pt verbally understood.

## 2024-02-23 NOTE — PROGRESS NOTES
"Subjective   Chief Complaint   Patient presents with    Follow-up     Vaginal and clitoris pain f/up     Shawanda Jeronimo is a 58 y.o. year old No obstetric history on file..  No LMP recorded. Patient has had a hysterectomy.  She presents to be seen for evaluation of vaginal pain.  She notes pain mostly after wiping.  At the time of her annual last month she was noted to have Candida glabrata infection was treated with boric acid.  She was also started on Estrace cream for vaginal atrophy.  She has been using this cream since her last visit.  She states her pain is more intense than the time of her annual making it difficult to sit.  She has pain with urination which she is unsure if this is pain from urination itself or from the vaginal irritation.  She states she has been dizzy on and off today just overall feels poorly.  She has no concerns for STD however is open to screening today as her  recommended this.  Denies any known fever    The following portions of the patient's history were reviewed and updated as appropriate:current medications and allergies    Social History    Tobacco Use      Smoking status: Never      Smokeless tobacco: Never         Objective   /80 (BP Location: Left arm, Patient Position: Sitting, Cuff Size: Adult)   Ht 170.2 cm (67\")   Wt 80.6 kg (177 lb 9.6 oz)   BMI 27.82 kg/m²   Pelvic exam: VULVA: no erythema or excoriation noted, atrophic changes noted , VAGINA: atrophic.    Lab Review   Previous 1 swab reviewed  Previous urine culture reviewed  Imaging   None        Assessment   Dysuria  Vaginal pain and irritation     Plan   Urine sent for analysis.  Her last urine showed white blood cells on sample however culture was negative.  Will start Macrobid and Pyridium.  1 swab sent for STI screening vaginitis panel.  Prescription for lidocaine jelly for pain relief sent to pharmacy file.  The importance of keeping all planned follow-up and taking all medications as " prescribed was emphasized.  Will notify patient once testing results are available if any further treatment is indicated.      New Medications Ordered This Visit   Medications    nitrofurantoin, macrocrystal-monohydrate, (Macrobid) 100 MG capsule     Sig: Take 1 capsule by mouth 2 (Two) Times a Day for 3 days.     Dispense:  6 capsule     Refill:  0    phenazopyridine (PYRIDIUM) 200 MG tablet     Sig: Take 1 tablet by mouth 3 (Three) Times a Day As Needed for Dysuria for up to 3 days.     Dispense:  9 tablet     Refill:  0    lidocaine (XYLOCAINE) 2 % jelly     Sig: Apply  topically to the appropriate area as directed As Needed for Mild Pain.     Dispense:  28.33 g     Refill:  0          This note was electronically signed.    Mere Patel, MADHU  February 23, 2024

## 2024-02-26 ENCOUNTER — TELEPHONE (OUTPATIENT)
Dept: OBSTETRICS AND GYNECOLOGY | Facility: CLINIC | Age: 59
End: 2024-02-26
Payer: MEDICARE

## 2024-02-26 LAB — BACTERIA SPEC AEROBE CULT: ABNORMAL

## 2024-02-26 RX ORDER — FLUCONAZOLE 150 MG/1
150 TABLET ORAL DAILY
Qty: 1 TABLET | Refills: 0 | Status: SHIPPED | OUTPATIENT
Start: 2024-02-26

## 2024-02-26 RX ORDER — CEPHALEXIN 500 MG/1
500 CAPSULE ORAL 4 TIMES DAILY
Qty: 28 CAPSULE | Refills: 0 | Status: SHIPPED | OUTPATIENT
Start: 2024-02-26 | End: 2024-03-04

## 2024-02-26 RX ORDER — LIDOCAINE AND PRILOCAINE 25; 25 MG/G; MG/G
1 CREAM TOPICAL
Qty: 30 G | Refills: 0 | Status: SHIPPED | OUTPATIENT
Start: 2024-02-26

## 2024-02-26 NOTE — TELEPHONE ENCOUNTER
Spoke with pt and she stated that she would be ok with getting the prescription here.    Per Mere LEIJA APRN:    Will you see if patient wants to get that here?  If so I will change prescription.  Meer

## 2024-02-26 NOTE — TELEPHONE ENCOUNTER
SHAYNA    Received call from patient stating the numbing medication/jell that was prescribed, she cannot locate anywhere (not [facility], [facility] or [facility]). Patient is wondering if there is any other place that carries it or how she would be able to get some.

## 2024-02-27 ENCOUNTER — TELEPHONE (OUTPATIENT)
Dept: OBSTETRICS AND GYNECOLOGY | Facility: CLINIC | Age: 59
End: 2024-02-27
Payer: MEDICARE

## 2024-02-27 NOTE — TELEPHONE ENCOUNTER
Pt informed and stated understanding.      Per Mere LEIJA, APRN:    Please call her with instructions for use, as directed externally for labial/vulvar pain.  Mere

## 2024-02-27 NOTE — TELEPHONE ENCOUNTER
SHAYNA        Patient called stating that she is confused on how to use the numbing cream that has been called in for her.  She asked if someone could help her.  Patient said she will be at the heart Dr from 11-1 and won't be able to answer her phone.  Please advise.

## 2024-05-02 ENCOUNTER — OFFICE VISIT (OUTPATIENT)
Age: 59
End: 2024-05-02
Payer: MEDICARE

## 2024-05-02 VITALS
SYSTOLIC BLOOD PRESSURE: 120 MMHG | HEIGHT: 67 IN | DIASTOLIC BLOOD PRESSURE: 60 MMHG | BODY MASS INDEX: 27.67 KG/M2 | WEIGHT: 176.3 LBS

## 2024-05-02 DIAGNOSIS — M65.341 TRIGGER RING FINGER OF RIGHT HAND: ICD-10-CM

## 2024-05-02 DIAGNOSIS — G56.03 CARPAL TUNNEL SYNDROME, BILATERAL: Primary | ICD-10-CM

## 2024-05-02 DIAGNOSIS — G56.21 CUBITAL TUNNEL SYNDROME ON RIGHT: ICD-10-CM

## 2024-05-02 DIAGNOSIS — M65.321 TRIGGER INDEX FINGER OF RIGHT HAND: ICD-10-CM

## 2024-05-02 DIAGNOSIS — M65.331 TRIGGER MIDDLE FINGER OF RIGHT HAND: ICD-10-CM

## 2024-05-02 RX ORDER — INSULIN GLARGINE 100 [IU]/ML
INJECTION, SOLUTION SUBCUTANEOUS
COMMUNITY
Start: 2024-04-29

## 2024-05-02 RX ORDER — BISACODYL 5 MG/1
5 TABLET, DELAYED RELEASE ORAL DAILY PRN
COMMUNITY

## 2024-05-02 RX ORDER — TRIAMCINOLONE ACETONIDE 40 MG/ML
20 INJECTION, SUSPENSION INTRA-ARTICULAR; INTRAMUSCULAR
Status: COMPLETED | OUTPATIENT
Start: 2024-05-02 | End: 2024-05-02

## 2024-05-02 RX ORDER — AMOXICILLIN 500 MG/1
500 CAPSULE ORAL DAILY
COMMUNITY
Start: 2024-04-29

## 2024-05-02 RX ORDER — LIDOCAINE HYDROCHLORIDE 10 MG/ML
0.5 INJECTION, SOLUTION EPIDURAL; INFILTRATION; INTRACAUDAL; PERINEURAL
Status: COMPLETED | OUTPATIENT
Start: 2024-05-02 | End: 2024-05-02

## 2024-05-02 RX ADMIN — LIDOCAINE HYDROCHLORIDE 0.5 ML: 10 INJECTION, SOLUTION EPIDURAL; INFILTRATION; INTRACAUDAL; PERINEURAL at 10:56

## 2024-05-02 RX ADMIN — TRIAMCINOLONE ACETONIDE 20 MG: 40 INJECTION, SUSPENSION INTRA-ARTICULAR; INTRAMUSCULAR at 10:56

## 2024-05-02 NOTE — PROGRESS NOTES
[facility]     Office Visit       Date: 05/02/2024   Patient Name: Shawanda Jeronimo  MRN: [id number]  YOB: 1965    Referring Physician: Referring, Self     Chief Complaint:   Chief Complaint   Patient presents with    Right Elbow - Pain    Right Wrist - Pain       History of Present Illness:   Shawanda Jeronimo is a 58 y.o. female right-hand-dominant presents for evaluation of right hand pain as well as bilateral hand numbness and tingling.  She reports that she has pain at the right index middle and ring fingers.  Reports occasional catching and locking.  She has not had previous corticosteroid injection.  She also reports bilateral hand numbness and tingling.  She reports numbness and tingling in all 5 fingers of her right hand.  She reports medial elbow pain that radiates distally into her fingers.  Reports numbness and tingling in her radial 4 fingers of her left hand.  She also reports numbness and tingling that is worse at night.  She has a history of right carpal tunnel and cubital tunnel release in her right hand over 10 years ago.  She has a history of insulin-dependent diabetes.  She is currently working.  She denies smoking.      Subjective   Review of Systems:   Review of Systems   Constitutional: Negative.  Negative for chills, fatigue and fever.   HENT: Negative.  Negative for congestion and dental problem.    Eyes: Negative.  Negative for blurred vision.   Respiratory: Negative.  Negative for shortness of breath.    Cardiovascular: Negative.  Negative for leg swelling.   Gastrointestinal: Negative.  Negative for abdominal pain.   Endocrine: Negative.  Negative for polyuria.   Genitourinary: Negative.  Negative for difficulty urinating.   Musculoskeletal:  Positive for arthralgias.   Skin: Negative.    Allergic/Immunologic: Negative.    Neurological: Negative.    Hematological: Negative.  Negative for adenopathy.  "  Psychiatric/Behavioral: Negative.  Negative for behavioral problems.         Pertinent review of systems per HPI.     I reviewed the patient's chief complaint, history of present illness, review of systems, past medical history, surgical history, family history, social history, medications and allergy list in the EMR on 05/02/2024 and agree with the findings above.    Objective    Vital Signs:   Vitals:    05/02/24 1033   BP: 120/60   Weight: 80 kg (176 lb 4.8 oz)   Height: 170.2 cm (67\")     BMI: Body mass index is 27.61 kg/m².    General Appearance: No acute distress. Alert and oriented.     Chest:  Non-labored breathing on room air. Regular rate and rhythm.    Upper Extremity Exam:    Right carpal and cubital tunnel incisions well-healed.  Positive Tinel at the right cubital tunnel.  Positive elbow flexion compression test.  Positive Tinel at the right carpal tunnel.  Positive carpal compression test.  Tender to palpation over the A1 pulleys of the index middle and ring fingers with palpable nodules.  There is catching with flexion of the index finger.  Wasting.  No thenar wasting.    Negative Tinel at the left cubital tunnel.  Negative elbow flexion compression test.  Positive Tinel at the left carpal tunnel.  Positive carpal compression test.  No intrinsic wasting.  No thenar wasting.    Fingers are warm, well-perfused with appropriate capillary refill.  Palpable radial pulse.    Sensation intact to light touch in median, radial and ulnar nerve distributions.    Motor- Fires FPL, ulnar intrinsics, EPL/EDC w/ full active and passive range of motion. Strength intact.    Non-tender except for in the areas highlighted    Imaging/Studies:   Imaging Results (Last 24 Hours)       ** No results found for the last 24 hours. **            none    Procedures:  Procedures    Quality Measures:   ACP:   ACP discussion was deferred.    Tobacco:   Shawanda ROSS Phani  reports that she has never smoked. She has been exposed to " tobacco smoke. She has never used smokeless tobacco.      Assessment / Plan    Assessment/Plan:     There are no diagnoses linked to this encounter.     Shawanda Moralez a 58 y.o. female who presents with:      ICD-10-CM ICD-9-CM   1. Carpal tunnel syndrome, bilateral  G56.03 354.0   2. Cubital tunnel syndrome on right  G56.21 354.2   3. Trigger index finger of right hand  M65.321 727.03   4. Trigger middle finger of right hand  M65.331 727.03   5. Trigger ring finger of right hand  M65.341 727.03       Patient presents with bilateral upper extremity complaints.  She has trigger fingers of her right index middle and ring finger, predominantly her index finger.  I discussed the pathophysiology of trigger finger as well as the options for treatment including corticosteroid injection versus observation or surgical release.  She would like to proceed with right index finger corticosteroid injection at this time.  Given her insulin-dependent diabetes we will only perform 1 corticosteroid injection at the time.  I will have her follow-up in 1 week for possible middle or ring finger trigger finger injection.    Also has evidence of left primary carpal tunnel syndrome as well as right recurrent carpal and cubital tunnel syndrome.  I discussed the pathophysiology of nerve compression with the patient.  Will get an EMG and nerve conduction study    Follow Up:   Return in about 1 week (around 5/9/2024).        Jack Olivier MD  [facility]

## 2024-05-02 NOTE — PROGRESS NOTES
Procedure   - Hand/Upper Extremity Injection: R index A1 for trigger finger on 5/2/2024 10:56 AM  Indications: pain  Details: 30 G needle, volar approach  Medications: 20 mg triamcinolone acetonide 40 MG/ML; 0.5 mL lidocaine PF 1% 1 %  Outcome: tolerated well, no immediate complications  Procedure, treatment alternatives, risks and benefits explained, specific risks discussed. Consent was given by the patient. Immediately prior to procedure a time out was called to verify the correct patient, procedure, equipment, support staff and site/side marked as required. Patient was prepped and draped in the usual sterile fashion.

## 2024-05-08 DIAGNOSIS — G56.03 CARPAL TUNNEL SYNDROME, BILATERAL: ICD-10-CM

## 2024-05-09 ENCOUNTER — OFFICE VISIT (OUTPATIENT)
Age: 59
End: 2024-05-09
Payer: MEDICARE

## 2024-05-09 VITALS
DIASTOLIC BLOOD PRESSURE: 75 MMHG | WEIGHT: 176.37 LBS | BODY MASS INDEX: 27.68 KG/M2 | SYSTOLIC BLOOD PRESSURE: 142 MMHG | HEIGHT: 67 IN

## 2024-05-09 DIAGNOSIS — M65.331 TRIGGER MIDDLE FINGER OF RIGHT HAND: ICD-10-CM

## 2024-05-09 DIAGNOSIS — G56.21 CUBITAL TUNNEL SYNDROME ON RIGHT: ICD-10-CM

## 2024-05-09 DIAGNOSIS — M65.321 TRIGGER INDEX FINGER OF RIGHT HAND: ICD-10-CM

## 2024-05-09 DIAGNOSIS — G56.03 CARPAL TUNNEL SYNDROME, BILATERAL: Primary | ICD-10-CM

## 2024-05-09 DIAGNOSIS — M65.341 TRIGGER RING FINGER OF RIGHT HAND: ICD-10-CM

## 2024-05-09 RX ORDER — TRIAMCINOLONE ACETONIDE 40 MG/ML
20 INJECTION, SUSPENSION INTRA-ARTICULAR; INTRAMUSCULAR
Status: COMPLETED | OUTPATIENT
Start: 2024-05-09 | End: 2024-05-09

## 2024-05-09 RX ORDER — LIDOCAINE HYDROCHLORIDE 10 MG/ML
0.5 INJECTION, SOLUTION EPIDURAL; INFILTRATION; INTRACAUDAL; PERINEURAL
Status: COMPLETED | OUTPATIENT
Start: 2024-05-09 | End: 2024-05-09

## 2024-05-09 RX ADMIN — TRIAMCINOLONE ACETONIDE 20 MG: 40 INJECTION, SUSPENSION INTRA-ARTICULAR; INTRAMUSCULAR at 09:23

## 2024-05-09 RX ADMIN — LIDOCAINE HYDROCHLORIDE 0.5 ML: 10 INJECTION, SOLUTION EPIDURAL; INFILTRATION; INTRACAUDAL; PERINEURAL at 09:23

## 2024-05-16 ENCOUNTER — TELEPHONE (OUTPATIENT)
Age: 59
End: 2024-05-16
Payer: MEDICARE

## 2024-05-16 NOTE — TELEPHONE ENCOUNTER
Sent message to patient to let her know that she can come by the office to  brace.    Zoë HERRERA) ROT

## 2024-05-16 NOTE — TELEPHONE ENCOUNTER
Patient scheduled for surgery 6/11/24 for carpal tunnel release.    Is it ok for her to come by to  a brace?      Zoë TODD (ISABELL) ROT

## 2024-06-11 ENCOUNTER — DOCUMENTATION (OUTPATIENT)
Age: 59
End: 2024-06-11
Payer: MEDICARE

## 2024-06-11 ENCOUNTER — OUTSIDE FACILITY SERVICE (OUTPATIENT)
Dept: ORTHOPEDIC SURGERY | Facility: CLINIC | Age: 59
End: 2024-06-11
Payer: MEDICARE

## 2024-06-11 NOTE — PROGRESS NOTES
DATE OF PROCEDURE: 06/11/2024    LOCATION: [facility]     PROCEDURES PERFORMED:    1. Left open carpal tunnel release CPT 44824    SURGEON: Jack Olivier MD      ASSISTANTS:    1. None        * Surgery not found *      ANESTHESIA: Mac w/ local    PREOPERATIVE DIAGNOSES:  1. Left carpal tunnel syndrome     POSTOPERATIVE DIAGNOSES:    Same     ESTIMATED BLOOD LOSS: 5 mL.    SPECIMENS: none    IMPLANTS: none    COMPLICATIONS: None     INDICATIONS:  Shawanda Jeronimo is a 59 y.o. female who initially presented with left carpal tunnel syndrome that has failed conservative therapy.  The risks, benefits, alternatives and potential complications of surgery were discussed with the patient including but not limited to bleeding scarring, infection, recurrence, stiffness, damage to surrounding structures and postoperative pain.  The patient understands the risks and agreed to proceed with surgery.  A surgical informed consent was signed prior to the procedure.      DESCRIPTION OF PROCEDURE:  The patient was greeted in the pre-operative holding area and the surgical site was marked and consent confirmed prior to bringing the patient to the operating room.   The patient was then taken to the operating room  and and a timeout was performed including the patient's name, procedure and antibiotic administration .  The patient was positioned supine on the operative room table and a non-sterile tourniquet was applied to the left upper extremity and it was then prepped and draped in the usual sterile fashion.   The patient was given MAC anesthesia and a local block was performed with combination 1% lidocaine with epinephrine and 0.5% Marcaine.  No antibiotics were given.     A 2.5 cm incision was made over the palm on the ulnar border the anticipated location of the transverse carpal ligament.  Superficial palmar fascia was then incised sharply.  Retractors were placed to allow visualization of the transverse  fibers of the transverse carpal ligament.  This was then carefully incised using a 15 blade.  Proximally the transverse carpal ligament and antebrachial fascia were incised under direct visualization using careful retraction and a tenotomy scissor.  Wound was then irrigated with copious amounts normal saline solution.  Incision was closed with 4-0 nylon in horizontal mattress fashion.  Soft dressing was applied.     At the end of the procedure the patient was awoken from anesthesia and transferred to the PACU in stable condition.  I participated in all parts of the case.    POSTOPERATIVE PLAN:  No lifting greater than 5 pounds with the operative extremity.  2.  Over the counter Tylenol and/or Advil/Aleve/Motrin for pain control.   3.  Dressings may be removed in 3 days and replaced with a dry daily dressing.  4.  Follow up in 10-14 days as scheduled.

## 2024-06-25 ENCOUNTER — TELEPHONE (OUTPATIENT)
Dept: OBSTETRICS AND GYNECOLOGY | Facility: CLINIC | Age: 59
End: 2024-06-25
Payer: MEDICARE

## 2024-06-25 NOTE — TELEPHONE ENCOUNTER
Patient called and said that when she showered this morning and washed her vagina that it burned and was irritated.  After she rinsed herself off and dried off, it went away.  Patient says that she hasn't used any different soap or detergent.  She is wondering why this would happen,  it has never happened before.  Please advise.

## 2024-06-25 NOTE — TELEPHONE ENCOUNTER
Called and spoke with patient.  She states that she used Caress soap on vaginal area and into vagina a bit as well.  She reports that she had a lot of burning and irritation but when rinsing it all off and out the symptoms subsided fully.  She is not having any lingering symptoms.  Patient will monitor for now and if she starts having any abnormal symptoms will call back into office and seek advisement.  Patient advised to avoid using soap directly in this area.

## 2024-06-27 ENCOUNTER — OFFICE VISIT (OUTPATIENT)
Age: 59
End: 2024-06-27
Payer: MEDICAID

## 2024-06-27 DIAGNOSIS — Z98.890 POST-OPERATIVE STATE: Primary | ICD-10-CM

## 2024-06-27 PROCEDURE — 1160F RVW MEDS BY RX/DR IN RCRD: CPT | Performed by: PLASTIC SURGERY

## 2024-06-27 PROCEDURE — 1159F MED LIST DOCD IN RCRD: CPT | Performed by: PLASTIC SURGERY

## 2024-06-27 PROCEDURE — 99024 POSTOP FOLLOW-UP VISIT: CPT | Performed by: PLASTIC SURGERY

## 2024-06-27 NOTE — PROGRESS NOTES
[facility]     Follow-up Office Visit       Date: 06/27/2024   Patient Name: Shawanda Jeronimo  MRN: [id number]  YOB: 1965    Chief Complaint:   Chief Complaint   Patient presents with    Post-op     2 weeks status post Left carpal tunnel release 6/11/24       History of Present Illness:   Shawanda Jeronimo is a 59 y.o. female presents 2-week status post left carpal tunnel release.  She is been doing well since her surgery.  Reports her pain has been well-controlled.  Reports her left upper extremity numbness and tingling has improved.  Reports that she also has improvements in her right hand numbness and tingling since her corticosteroid injection.  No other concerns      Subjective   Review of Systems:   Review of Systems     Pertinent review of systems per HPI    I reviewed the patient's chief complaint, history of present illness, review of systems, past medical history, surgical history, family history, social history, medications and allergy list in the EMR on 06/27/2024 and agree with the findings above.    Objective    Vital Signs: There were no vitals filed for this visit.  BMI: There is no height or weight on file to calculate BMI.     General Appearance: No acute distress. Alert and oriented.     Chest:  Non-labored breathing on room air      Ortho Exam:  Left carpal tunnel incision clear dry intact and healing appropriate  Fingers warm and well-perfused distally  Sensation intact to light touch in the median, radial and ulnar nerve distributions    Imaging/Studies:   Imaging Results (Last 24 Hours)       ** No results found for the last 24 hours. **              Procedures:  Procedures    Quality Measures:   ACP:   ACP discussion was deferred.    Tobacco:   Shawanda Jeronimo  reports that she has never smoked. She has been exposed to tobacco smoke. She has never used smokeless tobacco.    Assessment / Plan    Assessment/Plan:       Diagnosis Plan   1. Post-operative state            Patient 2 weeks status post left carpal tunnel release.  She is doing well postoperatively and her sutures removed today.  Recommend scar massage and continue home exercise program for finger and thumb range of motion.  Regarding her right carpal and cubital tunnel syndrome, she had a good response to injection.  Recommend continue observation and follow-up in 1 month for repeat check.    Follow Up:   Return in about 4 weeks (around 7/25/2024).        Jack Olivier MD  [facility] [place] [facility] Upper Extremity Surgeon

## 2024-07-25 ENCOUNTER — OFFICE VISIT (OUTPATIENT)
Age: 59
End: 2024-07-25
Payer: MEDICAID

## 2024-07-25 DIAGNOSIS — G56.21 CUBITAL TUNNEL SYNDROME ON RIGHT: Primary | ICD-10-CM

## 2024-07-25 DIAGNOSIS — G56.03 CARPAL TUNNEL SYNDROME, BILATERAL: ICD-10-CM

## 2024-07-25 PROCEDURE — 99024 POSTOP FOLLOW-UP VISIT: CPT | Performed by: PLASTIC SURGERY

## 2024-07-25 NOTE — PROGRESS NOTES
[facility]     Follow-up Office Visit       Date: 07/25/2024   Patient Name: Shawanda Jeronimo  MRN: [id number]  YOB: 1965    Chief Complaint:   Chief Complaint   Patient presents with    Post-op     1 month follow up; 6 weeks status post Left carpal tunnel release 6/11/24       History of Present Illness:   Shawanda Jeronimo is a 59 y.o. female presents for follow-up status post left carpal tunnel release.  She reports her left hand numbness and tingling has resolved.  Continues to have some soreness over the left carpal tunnel incision.  No other concerns regarding her left hand.  Regarding her right hand she continues to have numbness and tingling in all 5 fingers as well as the feeling of crampiness in her hand.  She has medial elbow pain at her cubital tunnel as well.  No other concerns at this time.      Subjective   Review of Systems:   Review of Systems   Constitutional: Negative.    HENT: Negative.     Eyes: Negative.    Respiratory: Negative.     Cardiovascular: Negative.    Gastrointestinal: Negative.    Endocrine: Negative.    Genitourinary: Negative.    Musculoskeletal:  Positive for arthralgias.   Skin: Negative.    Allergic/Immunologic: Negative.    Neurological: Negative.    Hematological: Negative.    Psychiatric/Behavioral: Negative.          Pertinent review of systems per HPI    I reviewed the patient's chief complaint, history of present illness, review of systems, past medical history, surgical history, family history, social history, medications and allergy list in the EMR on 07/25/2024 and agree with the findings above.    Objective    Vital Signs: There were no vitals filed for this visit.  BMI: There is no height or weight on file to calculate BMI.     General Appearance: No acute distress. Alert and oriented.     Chest:  Non-labored breathing on room air      Ortho Exam:  Tinel at the right carpal and  cubital tunnel.  Positive elbow flexion compression test.  Positive carpal compression test.  No thenar wasting.  No intrinsic wasting.  4+ out of 5 intrinsic strength.    Left carpal tunnel incision with firm immobile scar but no evidence of infection.    Fingers warm and well-perfused distally  Sensation intact to light touch in the median, radial and ulnar nerve distributions    Imaging/Studies:   Imaging Results (Last 24 Hours)       ** No results found for the last 24 hours. **              Procedures:  Procedures    Quality Measures:   ACP:   ACP discussion was deferred.    Tobacco:   Shawanda Jeronimo  reports that she has never smoked. She has been exposed to tobacco smoke. She has never used smokeless tobacco.    Assessment / Plan    Assessment/Plan:      Diagnosis Plan   1. Cubital tunnel syndrome on right  External Facility Surgical/Procedural Request      2. Carpal tunnel syndrome, bilateral  External Facility Surgical/Procedural Request          Patient presents for follow-up status post left carpal tunnel release.  She has evidence of recurrent right carpal and cubital tunnel on both exam and EMG that is failed conservative management such as home exercise program and right carpal tunnel corticosteroid injection.  At this time I would recommend right revision cubital tunnel release with anterior ulnar nerve transposition and right revision carpal tunnel release.  The patient like to proceed with surgery.  Recommend continue scar massage of the left carpal tunnel.  Will schedule her for surgery at the patient's convenience.    Consent-cubital tunnel release with anterior ulnar nerve transposition, right revision carpal tunnel release.    The risks and benefits of the procedure were discussed with the patient and or appropriate guardian, which include but are not limited to the risk of bleeding, infection, neurovascular damage, post-operative stiffness, recurrence, tendon and/or ligament retears, recurrent  instability, continued pain, arthritic pain, need for further revision surgeries in the future, deep venous thrombosis, and general risks from anesthesia. We also discussed the post-operative rehabilitation, the need for physical therapy, and the overall expected outcomes from the procedure. We also discussed the possible use of biologics including allograft. We allowed proper time and answered the patient's questions regarding the procedure. The patient expressed understanding. Knowing what the risks are and what the conservative treatment is, the patient elected to forgo any further conservative treatment options and proceed with the surgical intervention.      Follow Up:   Return for Follow Up after Post Op.        Jack Olivier MD  [facility]

## 2024-09-06 ENCOUNTER — OUTSIDE FACILITY SERVICE (OUTPATIENT)
Dept: ORTHOPEDIC SURGERY | Facility: CLINIC | Age: 59
End: 2024-09-06
Payer: MEDICAID

## 2024-09-06 ENCOUNTER — TELEPHONE (OUTPATIENT)
Dept: ORTHOPEDIC SURGERY | Facility: CLINIC | Age: 59
End: 2024-09-06

## 2024-09-06 ENCOUNTER — DOCUMENTATION (OUTPATIENT)
Dept: ORTHOPEDIC SURGERY | Facility: CLINIC | Age: 59
End: 2024-09-06

## 2024-09-06 DIAGNOSIS — Z98.890 POST-OPERATIVE STATE: Primary | ICD-10-CM

## 2024-09-06 PROCEDURE — 64721 CARPAL TUNNEL SURGERY: CPT | Performed by: PLASTIC SURGERY

## 2024-09-06 PROCEDURE — 64718 REVISE ULNAR NERVE AT ELBOW: CPT | Performed by: PLASTIC SURGERY

## 2024-09-06 PROCEDURE — 14021 TIS TRNFR S/A/L 10.1-30 SQCM: CPT | Performed by: PLASTIC SURGERY

## 2024-09-06 RX ORDER — HYDROCODONE BITARTRATE AND ACETAMINOPHEN 10; 325 MG/1; MG/1
1 TABLET ORAL EVERY 8 HOURS PRN
Qty: 30 TABLET | Refills: 0 | Status: SHIPPED | OUTPATIENT
Start: 2024-09-06

## 2024-09-06 NOTE — TELEPHONE ENCOUNTER
Pt calling to ask if she should be taking her blood pressure and blood sugar medication before her surgery at 9am, would like call back with this info

## 2024-09-06 NOTE — TELEPHONE ENCOUNTER
I called patient and did not get an answer, I left a voice,ial asking her to give me a call back as soon as she can at [phone]. Rianna Zeng CMA

## 2024-09-06 NOTE — PROGRESS NOTES
DATE OF PROCEDURE: 09/06/2024    LOCATION: [facility]     PROCEDURES PERFORMED:    1. Right cubital tunnel decompression with anterior transposition CPT 32006  2. Right adipofascial flap/local tissue rearrangement for ulnar nerve transposition 15 cm^2 CPT 15880   3. Right carpal tunnel release CPT 81606    SURGEON: Jack Olivier MD      ASSISTANTS:    1. None    * Surgery not found *      ANESTHESIA: General    PREOPERATIVE DIAGNOSES:  1. Right Recurrent Cubital Tunnel Syndrome  2. Right recurrent carpal tunnel syndrome     POSTOPERATIVE DIAGNOSES:    Same     ESTIMATED BLOOD LOSS: 5 mL.    SPECIMENS: None    IMPLANTS: None    COMPLICATIONS: None     INDICATIONS:  Shawanda Jeronimo is a 59 y.o. female who initially presented with right hand pain and numbness and evidence of recurrent carpal and cubital tunnel syndrome on EMG/Nerve conduction studies.  She had purchased a history of right carpal and cubital tunnel decompression over 10 years ago.  patient has failed conservative measures and would like to proceed with surgical decompression of the ulnar nerve at the elbow with anterior transposition and out of the fascial flap as well as revision right carpal tunnel release.  The risks, benefits, alternatives and potential complications of surgery were discussed with the patient including but not limited to bleeding scarring, infection, recurrence, stiffness, damage to surrounding structures or postoperative pain.  The patient understands the risks and agreed to proceed with surgery.  A surgical informed consent was signed prior to the procedure.     DESCRIPTION OF PROCEDURE:  The patient was greeted in the pre-operative holding area and the surgical site was marked and consent confirmed prior to bringing the patient to the operating room.  The patient was then taken to the operating room and a timeout was performed including the patient's name, procedure and antibiotic administration prior to  the patient receiving general anesthesia.  A non-sterile tourniquet was then applied to the right upper extremity. The patient was positioned supine on the operative room table and the right upper extremity was then prepped and draped in the usual sterile fashion.  The patient received the appropriate antibiotics within 1 hour of skin incision.     The right arm was exsanguinated and tourniquet set to 250 mmHg.     Attention was initially turned towards the right carpal tunnel.  The patient's previous incision was incised and extended slightly distally.  Combination of careful sharp and blunt dissection was used to carry the dissection of the skin as well as the underlying scar tissue.  The median nerve was identified at the proximal most aspect of the incision.  A [place] was used to free the median nerve many overlying scar tissue.  The median nerve was then released From proximal to distal.  There is significant persistent compression over the median nerve calcified the scar down transverse carpal ligament.  This was released in its entirety.  Next the proximal aspect of the antebrachial fascia was then released under direct visualization.  We confirmed complete decompression of the median nerve.  The wound was then irrigated with copious amounts normal saline solution and closed with 4-0 nylon in horizontal mattress fashion.    Attention was then turned towards the right cubital tunnel.    A longitudinal incision was made just posterior to the medial epicondyle at the location of the cubital tunnel.  Combination of careful sharp and blunt dissection were used to carry the incision down to the level of the fascia.  Care was taken to identify and protect branches of the medial antebrachial cutaneous nerve.  The ulnar nerve was then identified deep to the triceps just proximal to the cubital tunnel and protected while Luevano's ligament was carefully incised.  The superficial fascia of the FCU was incised distal to  the medial epicondyle allowing identification of the 2 heads of the FCU were identified the ulnar nerve was then traced distally as it continued under the 2 heads of the FCU and the deep fascia of the FCU was released in its entirety.  A complete release of the nerve was then confirmed in both the proximal and distal planes.  Any further proximal compression by the arcade of [place] was released under direct visualization and careful retraction.  After complete release of the nerve the elbow was ranged through complete flexion and extension and the ulnar nerve was unstable.  Because of this as well as the fact that this was a revision cubital tunnel surgery the decision was made to perform an anterior transposition with right adipofascial flap    The ulnar nerve was mobilized using careful blunt dissection from proximal to distal.  Prior to its transposition a 2 cm section of intermuscular septum was sharply resected.  Next the nerve was anterior transposed.  The sharp edge of the FCU fascia was released distally to ensure that there is no notching or catching of the nerve.     Next adipose fascial flap was raised from the overlying subcutaneous tissue.  Thick skin and subcutaneous flaps were elevated using sharp and blunt dissection just superficial to the deep muscular fascia.  The lateral perforating vessels were preserved.  Skin hooks were then placed superficially and then an adipose fascial flap was elevated deep to the skin, taking care to preserve the underlying superficial fascial layer.  This was performed using sharp dissection.  There were several perforating vessels that supply the blood flow of the adipose fascial flap that were identified and protected during the dissection. The total size of the adipofascial flap was approximately 15 square centimeters.     After we are happy with the size of the adipose fascial flap the ulnar nerve was gently wrapped in the adipose fascial flap from superficial to  deep and then tunneled around the deep aspect of the nerve.  This was inset with 3-0 Vicryl suture.  After inset of the adipose fascial flap around the nerve care was taken to ensure that there is no constriction of the nerve and no kinking of the nerve at any point along its course.  This completed the adipose fascial flap/local tissue rearrangement of the arm.      The wound was then closed in layered fashion with 3-0 Monocryl in deep dermal fashion followed by 4-0 Monocryl in running subcuticular fashion.  Steri-Strips and a posterior slab splint was then applied.     At the end of the procedure the patient was awoken from anesthesia and transferred to the PACU in stable condition.  I participated in all parts of the case.     POSTOPERATIVE PLAN:  No lifting greater than 5 pounds with the operative extremity.  2.  Over the counter Tylenol and/or Advil/Aleve/Motrin for pain control. A narcotic prescription was also provided as needed for breakthrough pain.  3.  Dressings to remain in place for 5 days and then begin gentle active and passive range of motion of the elbow.  4.  Follow up in 10-14 days as scheduled.

## 2024-09-10 ENCOUNTER — TELEPHONE (OUTPATIENT)
Dept: ORTHOPEDIC SURGERY | Facility: CLINIC | Age: 59
End: 2024-09-10
Payer: MEDICAID

## 2024-09-10 NOTE — TELEPHONE ENCOUNTER
Patient called stating that she is having cold chills from her surgery with Dr Olivier on 8/6/24, patient stated she is also experience a lot of pain about a 8/10 and is needing a solution     Best call back #[phone]    Please advise, thank you

## 2024-09-10 NOTE — TELEPHONE ENCOUNTER
I called the patient to let her know that per  -She can try to take an extra norco or increase the frequency to every 4-6 hours. Continue ice and elevation. Her pain should start to improve of the next 2-3 days as the swelling goes down.     I explained she should be elevating 4-5 pillows high to ensure she is elevating above her heart . I did advise she ice . She said she would try that and if she needed to increase the frequency of the norco she would . I told her if she had any new concerns to give us a call . Otherwise we will see her for her follow up with  on 9/16 . She verbalized understanding.

## 2024-09-12 ENCOUNTER — TELEPHONE (OUTPATIENT)
Dept: ORTHOPEDIC SURGERY | Facility: CLINIC | Age: 59
End: 2024-09-12
Payer: MEDICAID

## 2024-09-12 NOTE — TELEPHONE ENCOUNTER
Dr. Olivier-based on the op note, I see that the patient was placed in a posterior slab splint but I also see where your post-operative plan stated she could remove dressing after 5 days and begin gentle range of motion; So can you please confirm that since pt is 6 days post-op she is able to remove her splint and dressings?    Thanks!  Abelino PAYAN CMA ([facility]), ROT

## 2024-09-12 NOTE — TELEPHONE ENCOUNTER
Patient is calling requesting a return call to ask when she can remove the sling / splint from her right hand.

## 2024-09-12 NOTE — TELEPHONE ENCOUNTER
Spoke to pt to let her know Dr. Olivier gave the okay for her to remove the splint/sling and begin gentle ROM of the elbow. Pt verbalized understanding.    Abelino PAYAN CMA ([facility]), ROT

## 2024-09-16 ENCOUNTER — OFFICE VISIT (OUTPATIENT)
Age: 59
End: 2024-09-16
Payer: MEDICAID

## 2024-09-16 VITALS — TEMPERATURE: 98.4 F

## 2024-09-16 DIAGNOSIS — Z98.890 POST-OPERATIVE STATE: Primary | ICD-10-CM

## 2024-09-16 PROCEDURE — 99024 POSTOP FOLLOW-UP VISIT: CPT | Performed by: PLASTIC SURGERY

## 2024-09-16 PROCEDURE — 1160F RVW MEDS BY RX/DR IN RCRD: CPT | Performed by: PLASTIC SURGERY

## 2024-09-16 PROCEDURE — 1159F MED LIST DOCD IN RCRD: CPT | Performed by: PLASTIC SURGERY

## 2024-09-16 RX ORDER — HYDROCODONE BITARTRATE AND ACETAMINOPHEN 10; 325 MG/1; MG/1
1 TABLET ORAL EVERY 8 HOURS PRN
Qty: 20 TABLET | Refills: 0 | Status: SHIPPED | OUTPATIENT
Start: 2024-09-16 | End: 2024-09-16

## 2024-09-19 ENCOUNTER — TELEPHONE (OUTPATIENT)
Dept: ORTHOPEDIC SURGERY | Facility: CLINIC | Age: 59
End: 2024-09-19
Payer: MEDICAID

## 2024-10-17 ENCOUNTER — OFFICE VISIT (OUTPATIENT)
Age: 59
End: 2024-10-17
Payer: MEDICAID

## 2024-10-17 DIAGNOSIS — Z98.890 POST-OPERATIVE STATE: Primary | ICD-10-CM

## 2024-10-17 DIAGNOSIS — M65.331 TRIGGER MIDDLE FINGER OF RIGHT HAND: ICD-10-CM

## 2024-10-17 RX ORDER — TRIAMCINOLONE ACETONIDE 40 MG/ML
20 INJECTION, SUSPENSION INTRA-ARTICULAR; INTRAMUSCULAR
Status: COMPLETED | OUTPATIENT
Start: 2024-10-17 | End: 2024-10-17

## 2024-10-17 RX ORDER — LORATADINE 10 MG/1
10 TABLET ORAL DAILY
COMMUNITY
Start: 2024-09-29

## 2024-10-17 RX ORDER — LIDOCAINE HYDROCHLORIDE 10 MG/ML
0.5 INJECTION, SOLUTION EPIDURAL; INFILTRATION; INTRACAUDAL; PERINEURAL
Status: COMPLETED | OUTPATIENT
Start: 2024-10-17 | End: 2024-10-17

## 2024-10-17 RX ADMIN — TRIAMCINOLONE ACETONIDE 20 MG: 40 INJECTION, SUSPENSION INTRA-ARTICULAR; INTRAMUSCULAR at 10:17

## 2024-10-17 RX ADMIN — LIDOCAINE HYDROCHLORIDE 0.5 ML: 10 INJECTION, SOLUTION EPIDURAL; INFILTRATION; INTRACAUDAL; PERINEURAL at 10:17

## 2024-10-17 NOTE — PROGRESS NOTES
[facility]     Follow-up Office Visit       Date: 10/17/2024   Patient Name: Shawanda Jeronimo  MRN: [id number]  YOB: 1965    Chief Complaint:   Chief Complaint   Patient presents with    Post-op     1 month follow up- 6 weeks s/p Right cubital tunnel decompression with anterior transposition; Right adipofascial flap/local tissue rearrangement for ulnar nerve transposition 15 cm^2; Right carpal tunnel release(DOS 9/6/24)       History of Present Illness:   Shawanda Jeronimo is a 59 y.o. female 6 weeks status post right cubital tunnel decompression and anterior transposition as well as right carpal tunnel release.  She been doing well since last visit.  And working with hand therapy and reports increased range of motion of the right hand.  Improvements in right upper extremity pain.  Reports she occasionally gets some numbness and tingling in her right hand that is intermittent.  She also has noticed increased pain at her right middle finger A1 pulley as well as some stiffness with flexion of the right middle finger.  Otherwise no concerns.      Subjective   Review of Systems:   Review of Systems   All other systems reviewed and are negative.       Pertinent review of systems per HPI    I reviewed the patient's chief complaint, history of present illness, review of systems, past medical history, surgical history, family history, social history, medications and allergy list in the EMR on 10/17/2024 and agree with the findings above.    Objective    Vital Signs: There were no vitals filed for this visit.  BMI: There is no height or weight on file to calculate BMI.     General Appearance: No acute distress. Alert and oriented.     Chest:  Non-labored breathing on room air      Ortho Exam:  Right carpal and cubital tunnel incisions are clear dry intact and well-healed.  Mildly tender to palpation over both incisions.  Patient able to  extend the elbow to approximately 10 degrees of extension.  Full flexion.  Tender palpation over the right middle finger A1 pulley with palpable nodule.  There is pain with flexion at the PIP joint.  Fingers warm and well-perfused distally  Sensation intact to light touch in the median, radial and ulnar nerve distributions    Imaging/Studies:   Imaging Results (Last 24 Hours)       ** No results found for the last 24 hours. **                Procedures:  Procedures    Quality Measures:   ACP:   ACP discussion was deferred.    Tobacco:   Shawanda Jeronimo  reports that she has never smoked. She has been exposed to tobacco smoke. She has never used smokeless tobacco.    Assessment / Plan    Assessment/Plan:      Diagnosis Plan   1. Post-operative state        2. Trigger middle finger of right hand            Patient is 6 weeks status post right carpal tunnel and right cubital tunnel release with anterior transposition.  She has a history of chronic pain and her postoperative course has been complicated due to increased pain however she is doing better with therapy and recovering appropriately.  Recommend continue physical therapy.  Continue scar massage.    She also has evidence of right middle finger trigger finger today that is causing pain and stiffness of the right middle finger.  Recommend corticosteroid injection today.    Recommend patient follow-up in 3 months.    Procedure Note:    I discussed with the patient the potential benefits of performing therapeutic aspiration and injections as well as potential risks including but not limited to infection, swelling, pain, bleeding, bruising, nerve/vessel damage, skin color changes, transient elevation in blood glucose levels, and fat atrophy. After informed consent and after the areas were prepped with chlorhexadine soap, ethyl chloride was used to numb the skin. 0.5 mL of 1% lidocaine was injected followed by injection of 20mg of Kenalog into the A1 pulley of the right  middle finger.  The patient tolerated the procedure well. There were no complications. A sterile dressing was placed over the injection sites.      Follow Up:   Return in about 3 months (around 1/17/2025).        Jack Olivier MD  [facility] [place] Hand and Upper Extremity Surgeon

## 2024-10-17 NOTE — PROGRESS NOTES
Procedure   - Hand/Upper Extremity Injection: R long A1 for trigger finger on 10/17/2024 10:17 AM  Indications: pain  Details: 27 G needle, volar approach  Medications: 0.5 mL lidocaine PF 1% 1 %; 20 mg triamcinolone acetonide 40 MG/ML  Outcome: tolerated well, no immediate complications  Procedure, treatment alternatives, risks and benefits explained, specific risks discussed. Consent was given by the patient. Immediately prior to procedure a time out was called to verify the correct patient, procedure, equipment, support staff and site/side marked as required. Patient was prepped and draped in the usual sterile fashion.

## 2024-10-24 ENCOUNTER — OFFICE VISIT (OUTPATIENT)
Dept: ORTHOPEDIC SURGERY | Facility: CLINIC | Age: 59
End: 2024-10-24
Payer: MEDICAID

## 2024-10-24 VITALS
HEIGHT: 67 IN | DIASTOLIC BLOOD PRESSURE: 72 MMHG | BODY MASS INDEX: 28.79 KG/M2 | SYSTOLIC BLOOD PRESSURE: 122 MMHG | WEIGHT: 183.4 LBS

## 2024-10-24 DIAGNOSIS — M25.522 LEFT ELBOW PAIN: Primary | ICD-10-CM

## 2024-10-24 DIAGNOSIS — S50.02XA CONTUSION OF LEFT ELBOW, INITIAL ENCOUNTER: ICD-10-CM

## 2024-10-24 PROCEDURE — 99213 OFFICE O/P EST LOW 20 MIN: CPT | Performed by: PHYSICIAN ASSISTANT

## 2024-10-24 PROCEDURE — 1159F MED LIST DOCD IN RCRD: CPT | Performed by: PHYSICIAN ASSISTANT

## 2024-10-24 PROCEDURE — 1160F RVW MEDS BY RX/DR IN RCRD: CPT | Performed by: PHYSICIAN ASSISTANT
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
## 2024-10-28 ENCOUNTER — OFFICE VISIT (OUTPATIENT)
Age: 59
End: 2024-10-28
Payer: MEDICAID

## 2024-10-28 DIAGNOSIS — M79.642 LEFT HAND PAIN: Primary | ICD-10-CM

## 2024-10-28 NOTE — PROGRESS NOTES
[facility]     Follow-up Office Visit       Date: 10/28/2024   Patient Name: Shawanda Jeronimo  MRN: [id number]  YOB: 1965    Chief Complaint:   Chief Complaint   Patient presents with    Left Hand - Pain     status post Left carpal tunnel release 6/11/24       History of Present Illness:   Shawanda Jeronimo is a 59 y.o. female presents with a new complaint of left hand pain.  Patient was doing well until this week and she felt a pop in her left wrist on Saturday while trying to  her dog.  She has had been having shooting pains as well as occasional numbness and tingling since then.  She reports some subjective weakness      Subjective   Review of Systems:   Review of Systems   Constitutional: Negative.    HENT: Negative.     Eyes: Negative.    Respiratory: Negative.     Cardiovascular: Negative.    Gastrointestinal: Negative.    Endocrine: Negative.    Genitourinary: Negative.    Musculoskeletal:  Positive for arthralgias.   Skin: Negative.    Allergic/Immunologic: Negative.    Neurological: Negative.    Hematological: Negative.    Psychiatric/Behavioral: Negative.          Pertinent review of systems per HPI    I reviewed the patient's chief complaint, history of present illness, review of systems, past medical history, surgical history, family history, social history, medications and allergy list in the EMR on 10/28/2024 and agree with the findings above.    Objective    Vital Signs: There were no vitals filed for this visit.  BMI: There is no height or weight on file to calculate BMI.     General Appearance: No acute distress. Alert and oriented.     Chest:  Non-labored breathing on room air      Ortho Exam:  Right carpal tunnel left carpal tunnel incision well-healed.  Negative Tinel over the carpal tunnel.  Mildly tender palpation throughout the volar wrist and into the hand.  FPL intact.  FDS and FDP to all fingers are  intact.  Patient is able to make a full composite fist.  Nontender over the left radiocarpal joint.  Fingers warm and well-perfused distally  Sensation intact to light touch in the median, radial and ulnar nerve distributions    Imaging/Studies:   Imaging Results (Last 24 Hours)       ** No results found for the last 24 hours. **              Procedures:  Procedures    Quality Measures:   ACP:   ACP discussion was deferred.    Tobacco:   Shawanda Jeronimo  reports that she has never smoked. She has been exposed to tobacco smoke. She has never used smokeless tobacco.    Assessment / Plan    Assessment/Plan:      Diagnosis Plan   1. Left hand pain  Ambulatory Referral to Occupational Therapy for Evaluation & Treatment          Presents with left volar wrist and hand pain after feeling something pop while lifting her small dog up over the weekend.  Most of her tenderness is localized to the carpal tunnel but she has no evidence of a flexor tendon rupture on exam today.  She likely has a strain of the flexor tendons in the carpal tunnel.  Recommend continue hand therapy for left hand range of motion as well as scar massage.  Recommend  strengthening on the left.  Recommend follow-up in 1 month for repeat check.  If patient has worsening pain and range of motion will consider MRI at that time.    Follow Up:   No follow-ups on file.        Jack Olivier MD  [facility] [place] Hand and Upper Extremity Surgeon

## 2024-11-04 ENCOUNTER — TELEPHONE (OUTPATIENT)
Dept: ORTHOPEDIC SURGERY | Facility: CLINIC | Age: 59
End: 2024-11-04
Payer: MEDICARE

## 2024-11-04 NOTE — TELEPHONE ENCOUNTER
Nadiya Rose (:  ELLIE    Received a call from the patient stating on Saturday 11/2/24, she was pulling her patients up and felt a \"painful pull\", on the surgical part of her elbow on the side. Patient cannot recall if she heard any sound associated with the pull. On a pain scale of 1-10, patient indicates she is anywhere from 3-6. Patient denies any other symptoms with the exception of the pain, \"intermittent cold chills,\" and moderate swelling.  Patient states she hit the corner of her elbow bone on her microwave, after she felt the \"pull,\" and is concerned. During our call, patient states her \"dog just hit her elbow on her surgical scar,\" which caused more pain. Patient is requesting a call back at someone's convenience, to see what her provider would like her to do. Please advise.     Thank you kindly!   "

## 2024-11-05 NOTE — TELEPHONE ENCOUNTER
Charline, Jack Gutierrez MD  You17 hours ago (4:21 PM)     I don't think so, I already saw her for something similar a couple weeks ago. That area will continue to be sensitive as she uses it more- if the pain from the pull isn't better in a week then I will have her schedule an earlier follow-up       I spoke with the patient to let her know what  stated below. The patient verbalized understanding and will call back in a week if she needs to be seen sooner.     Tayla Turner

## 2024-12-02 ENCOUNTER — OFFICE VISIT (OUTPATIENT)
Age: 59
End: 2024-12-02
Payer: MEDICARE

## 2024-12-02 VITALS
BODY MASS INDEX: 27.94 KG/M2 | HEIGHT: 67 IN | SYSTOLIC BLOOD PRESSURE: 142 MMHG | WEIGHT: 178 LBS | DIASTOLIC BLOOD PRESSURE: 88 MMHG

## 2024-12-02 DIAGNOSIS — M18.12 ARTHRITIS OF CARPOMETACARPAL (CMC) JOINT OF LEFT THUMB: ICD-10-CM

## 2024-12-02 DIAGNOSIS — Z98.890 POST-OPERATIVE STATE: Primary | ICD-10-CM

## 2024-12-02 DIAGNOSIS — M18.11 ARTHRITIS OF CARPOMETACARPAL (CMC) JOINT OF RIGHT THUMB: ICD-10-CM

## 2024-12-02 PROCEDURE — 1160F RVW MEDS BY RX/DR IN RCRD: CPT | Performed by: PLASTIC SURGERY

## 2024-12-02 PROCEDURE — 1159F MED LIST DOCD IN RCRD: CPT | Performed by: PLASTIC SURGERY

## 2024-12-02 PROCEDURE — 20600 DRAIN/INJ JOINT/BURSA W/O US: CPT | Performed by: PLASTIC SURGERY

## 2024-12-02 PROCEDURE — 99213 OFFICE O/P EST LOW 20 MIN: CPT | Performed by: PLASTIC SURGERY

## 2024-12-02 RX ORDER — TRIAMCINOLONE ACETONIDE 40 MG/ML
20 INJECTION, SUSPENSION INTRA-ARTICULAR; INTRAMUSCULAR
Status: COMPLETED | OUTPATIENT
Start: 2024-12-02 | End: 2024-12-02

## 2024-12-02 RX ORDER — LIDOCAINE HYDROCHLORIDE 10 MG/ML
0.5 INJECTION, SOLUTION EPIDURAL; INFILTRATION; INTRACAUDAL; PERINEURAL
Status: COMPLETED | OUTPATIENT
Start: 2024-12-02 | End: 2024-12-02

## 2024-12-02 RX ORDER — BENZONATATE 100 MG/1
CAPSULE ORAL
COMMUNITY
Start: 2024-11-29

## 2024-12-02 RX ORDER — METHENAMINE, SODIUM PHOSPHATE, MONOBASIC, ANHYDROUS, PHENYL SALICYLATE, METHYLENE BLUE AND HYOSCYAMINE SULFATE 118; 40.8; 36; 10; .12 MG/1; MG/1; MG/1; MG/1; MG/1
CAPSULE ORAL
COMMUNITY
Start: 2024-11-19

## 2024-12-02 RX ORDER — FENOFIBRATE 48 MG/1
TABLET, COATED ORAL
COMMUNITY
Start: 2024-11-18

## 2024-12-02 RX ORDER — PROMETHAZINE HYDROCHLORIDE 25 MG/1
TABLET ORAL
COMMUNITY
Start: 2024-11-21

## 2024-12-02 RX ADMIN — LIDOCAINE HYDROCHLORIDE 0.5 ML: 10 INJECTION, SOLUTION EPIDURAL; INFILTRATION; INTRACAUDAL; PERINEURAL at 13:40

## 2024-12-02 RX ADMIN — TRIAMCINOLONE ACETONIDE 20 MG: 40 INJECTION, SUSPENSION INTRA-ARTICULAR; INTRAMUSCULAR at 13:40

## 2024-12-02 NOTE — PROGRESS NOTES
[facility]     Follow-up Office Visit       Date: 12/02/2024   Patient Name: Shawanda Jeronimo  MRN: [id number]  YOB: 1965    Chief Complaint:   Chief Complaint   Patient presents with    Follow-up     5 week recheck-status post Right cubital tunnel decompression with anterior transposition; Right adipofascial flap/local tissue rearrangement for ulnar nerve transposition 15 cm^2; Right carpal tunnel release 9/6/24 &  status post Left carpal tunnel release - DOS 6/11/24       History of Present Illness:   Shawanda Jeronimo is a 59 y.o. female dents for follow-up bilateral hand problems.  She has a history of a left carpal tunnel release as well as right carpal tunnel and revision cubital tunnel release.  She has been doing okay since her last visit.  Reports no new acute concerns.  She reports ongoing scar tenderness particularly over the right carpal tunnel incision.  Reports left carpal tunnel incision has improved.  She reports improvement in her right elbow pain and range of motion.  She also reports bilateral basilar thumb pain.  She is custom braces for bilateral thumb CMC's however reports she continues to have pain.  No other concerns at this time.      Subjective   Review of Systems:   Review of Systems   Constitutional:  Negative for chills, fever, unexpected weight gain and unexpected weight loss.   HENT:  Negative for congestion, postnasal drip and rhinorrhea.    Eyes:  Negative for blurred vision.   Respiratory:  Negative for shortness of breath.    Cardiovascular:  Negative for leg swelling.   Gastrointestinal:  Negative for abdominal pain, nausea and vomiting.   Genitourinary:  Negative for difficulty urinating.   Musculoskeletal:  Positive for arthralgias. Negative for gait problem, joint swelling and myalgias.   Skin:  Negative for skin lesions and wound.   Neurological:  Negative for dizziness, weakness,  "light-headedness and numbness.   Hematological:  Does not bruise/bleed easily.   Psychiatric/Behavioral:  Negative for depressed mood.    All other systems reviewed and are negative.       Pertinent review of systems per HPI    I reviewed the patient's chief complaint, history of present illness, review of systems, past medical history, surgical history, family history, social history, medications and allergy list in the EMR on 12/02/2024 and agree with the findings above.    Objective    Vital Signs:   Vitals:    12/02/24 1316   BP: 142/88   Weight: 80.7 kg (178 lb)   Height: 170.2 cm (67.01\")     BMI: Body mass index is 27.87 kg/m².     General Appearance: No acute distress. Alert and oriented.     Chest:  Non-labored breathing on room air      Ortho Exam:  Right cubital tunnel incision well-healed.  Bilateral carpal tunnel incisions well-healed.  Some hypertrophic scarring at the right carpal tunnel.  Mild scar tenderness on the right.  Tender palpation of the bilateral thumb CMC's.  Positive CMC shuck test.  Negative grind test.  Fingers warm and well-perfused distally  Sensation intact to light touch in the median, radial and ulnar nerve distributions    Imaging/Studies:   Imaging Results (Last 24 Hours)       Procedure Component Value Units Date/Time    XR Hand 3+ View Bilateral [[id number]] Resulted: 12/02/24 1359     Updated: 12/02/24 1359    Narrative:      Bilateral Hand X-Ray    Indication: Pain    Views:  AP, Lateral, and Oblique     Comparison:  None    Findings:  No fracture  No bony lesion  Normal soft tissues  Mild degenerative changes of the bilateral thumb CMC's.    Impression:   Mild bilateral thumb CMC arthritis.                Procedures:  Procedures    Quality Measures:   ACP:   ACP discussion was deferred.    Tobacco:   Shawanda ROSS Phani  reports that she has never smoked. She has been exposed to tobacco smoke. She has never used smokeless tobacco.    Assessment / Plan    Assessment/Plan:      " Diagnosis Plan   1. Post-operative state  Ambulatory Referral to Occupational Therapy for Evaluation & Treatment      2. Arthritis of carpometacarpal (CMC) joint of right thumb  XR Hand 3+ View Bilateral      3. Arthritis of carpometacarpal (CMC) joint of left thumb  XR Hand 3+ View Bilateral          For follow-up of bilateral surgery.  She continues to have persistent right sided scar tenderness over carpal tunnel incision slowly improving with physical therapy and nerve gliding exercises.  She also has bilateral thumb CMC arthritis.  Recommend continue occupational therapy for bilateral scar tenderness as well as nerve gliding exercises.  Discussed treatment options for her CMC arthritis.  Patient like to trial left thumb CMC injection today.  Recommend follow-up in 3 months for repeat check.     Procedure Note:    I discussed with the patient the potential benefits of performing therapeutic aspiration and injections as well as potential risks including but not limited to infection, swelling, pain, bleeding, bruising, nerve/vessel damage, skin color changes, transient elevation in blood glucose levels, and fat atrophy. After informed consent and after the areas were prepped with chlorhexadine soap, ethyl chloride was used to numb the skin. Via the dorsal approach, 0.5 mL of 1% lidocaine was injected followed by injection of 20mg of Kenalog into the left thumb CMC joint.  The patient tolerated the procedure well. There were no complications. A sterile dressing was placed over the injection sites.      Follow Up:   Return in about 3 months (around 3/2/2025).        Jack Olivier MD  [facility] [place] Hand and Upper Extremity Surgeon

## 2024-12-02 NOTE — PROGRESS NOTES
Procedure   - Small Joint Arthrocentesis: L thumb CMC on 12/2/2024 1:40 PM  Indications: pain  Details: 27 G needle, lateral approach  Medications: 20 mg triamcinolone acetonide 40 MG/ML; 0.5 mL lidocaine PF 1% 1 %  Outcome: tolerated well, no immediate complications  Consent was given by the patient. Immediately prior to procedure a time out was called to verify the correct patient, procedure, equipment, support staff and site/side marked as required. Patient was prepped and draped in the usual sterile fashion.

## 2024-12-27 ENCOUNTER — OFFICE VISIT (OUTPATIENT)
Dept: ORTHOPEDIC SURGERY | Facility: CLINIC | Age: 59
End: 2024-12-27
Payer: MEDICARE

## 2024-12-27 VITALS
BODY MASS INDEX: 27.94 KG/M2 | WEIGHT: 178 LBS | DIASTOLIC BLOOD PRESSURE: 76 MMHG | HEIGHT: 67 IN | SYSTOLIC BLOOD PRESSURE: 122 MMHG

## 2024-12-27 DIAGNOSIS — Z98.890 POST-OPERATIVE STATE: ICD-10-CM

## 2024-12-27 DIAGNOSIS — M18.12 ARTHRITIS OF CARPOMETACARPAL (CMC) JOINT OF LEFT THUMB: ICD-10-CM

## 2024-12-27 DIAGNOSIS — M18.11 ARTHRITIS OF CARPOMETACARPAL (CMC) JOINT OF RIGHT THUMB: Primary | ICD-10-CM

## 2024-12-27 NOTE — PROGRESS NOTES
"                                                                 [facility] Orthopedic     Follow-up Office Visit       Date: 12/27/2024   Patient Name: Shawanda Jeronimo  MRN: [id number]  YOB: 1965    Chief Complaint:   Chief Complaint   Patient presents with    Follow-up     3.5 week follow up---status post Right cubital tunnel decompression with anterior transposition; Right adipofascial flap/local tissue rearrangement for ulnar nerve transposition 15 cm^2; Right carpal tunnel release 9/6/24 &  status post Left carpal tunnel release - DOS 6/11/24       History of Present Illness:   Shawanda Jeronimo is a 59 y.o. female is for follow-up of bilateral hand and elbow pain.  Patient reports she continues to have right elbow pain and hypersensitivity.  Reports slowly improving therapy.  Reports her bilateral thumb CMC pain is stable.  Corticosteroid injection of the left thumb at her last visit with minimal improvement in her symptoms.  She did notice an episode of numbness and tingling in her left small and ring finger that resolved on its own.  Continues to work with hand therapy.  No new concerns.      Subjective   Review of Systems:   Review of Systems     Pertinent review of systems per HPI    I reviewed the patient's chief complaint, history of present illness, review of systems, past medical history, surgical history, family history, social history, medications and allergy list in the EMR on 12/27/2024 and agree with the findings above.    Objective    Vital Signs:   Vitals:    12/27/24 0931   BP: 122/76   Weight: 80.7 kg (178 lb)   Height: 170.2 cm (67.01\")     BMI: Body mass index is 27.87 kg/m².     General Appearance: No acute distress. Alert and oriented.     Chest:  Non-labored breathing on room air      Ortho Exam:  Right cubital tunnel incision well-healed.  Bilateral carpal tunnel incisions well-healed.  Some hypertrophic scarring at the right carpal tunnel.  Positive Tinel at the left cubital " tunnel.  Negative left elbow flexion compression test.  Mild scar tenderness on the right.  Tender palpation of the bilateral thumb CMC's.  Positive CMC shuck test.  Negative grind test.  Fingers warm and well-perfused distally  Sensation intact to light touch in the median, radial and ulnar nerve distributions    Imaging/Studies:   Imaging Results (Last 24 Hours)       ** No results found for the last 24 hours. **              Procedures:  Procedures    Quality Measures:   ACP:   ACP discussion was deferred.    Tobacco:   Shawanda Jeronimo  reports that she has never smoked. She has been exposed to tobacco smoke. She has never used smokeless tobacco.    Assessment / Plan    Assessment/Plan:      Diagnosis Plan   1. Arthritis of carpometacarpal (CMC) joint of right thumb        2. Arthritis of carpometacarpal (CMC) joint of left thumb        3. Post-operative state            Patient presents for follow-up of right revision cubital tunnel release as well as bilateral carpal tunnel release.  She continues to have pain from her bilateral thumb CMC arthritis.  No improvement with corticosteroid injection.  She also has evidence of mild left cubital tunnel syndrome.  Recommend continue hand therapy.  Recommend continue observation.  Follow-up with me in 3 months.    Follow Up:   Return in about 3 months (around 3/27/2025).        Jack Olivier MD  [facility]remity Surgeon

## 2025-01-13 ENCOUNTER — OFFICE VISIT (OUTPATIENT)
Age: 60
End: 2025-01-13
Payer: MEDICARE

## 2025-01-13 VITALS
BODY MASS INDEX: 27.94 KG/M2 | SYSTOLIC BLOOD PRESSURE: 112 MMHG | DIASTOLIC BLOOD PRESSURE: 70 MMHG | WEIGHT: 178 LBS | HEIGHT: 67 IN

## 2025-01-13 DIAGNOSIS — Z98.890 POST-OPERATIVE STATE: Primary | ICD-10-CM

## 2025-01-13 DIAGNOSIS — G56.21 CUBITAL TUNNEL SYNDROME ON RIGHT: ICD-10-CM

## 2025-01-13 PROCEDURE — 99213 OFFICE O/P EST LOW 20 MIN: CPT | Performed by: PLASTIC SURGERY

## 2025-01-13 PROCEDURE — 1159F MED LIST DOCD IN RCRD: CPT | Performed by: PLASTIC SURGERY

## 2025-01-13 PROCEDURE — 1160F RVW MEDS BY RX/DR IN RCRD: CPT | Performed by: PLASTIC SURGERY

## 2025-01-13 RX ORDER — CHOLECALCIFEROL (VITAMIN D3) 25 MCG
TABLET ORAL DAILY
COMMUNITY
Start: 2024-12-05

## 2025-01-13 NOTE — PROGRESS NOTES
[facility] Orthopedic     Follow-up Office Visit       Date: 01/13/2025   Patient Name: Shawanda Jeronimo  MRN: [id number]  YOB: 1965    Chief Complaint:   Chief Complaint   Patient presents with    Follow-up     3 week follow up -- Right cubital tunnel decompression with anterior transposition; Right adipofascial flap/local tissue rearrangement for ulnar nerve transposition 15 cm^2 6/11/24       History of Present Illness:   Shawanda Jeronimo is a 59 y.o. female presents with right elbow pain.  Patient reports that approximately 1 week ago she hit her right elbow while attempting to pull herself up into her truck.  Reports she has had increased pain since then.  Denies numbness and tingling in her hand.  No other concerns.  Continues to work with hand therapy.      Subjective   Review of Systems:   Review of Systems   Constitutional:  Negative for chills, fever, unexpected weight gain and unexpected weight loss.   HENT:  Negative for congestion, postnasal drip and rhinorrhea.    Eyes:  Negative for blurred vision.   Respiratory:  Negative for shortness of breath.    Cardiovascular:  Negative for leg swelling.   Gastrointestinal:  Negative for abdominal pain, nausea and vomiting.   Genitourinary:  Negative for difficulty urinating.   Musculoskeletal:  Positive for arthralgias. Negative for gait problem, joint swelling and myalgias.   Skin:  Negative for skin lesions and wound.   Neurological:  Negative for dizziness, weakness, light-headedness and numbness.   Hematological:  Does not bruise/bleed easily.   Psychiatric/Behavioral:  Negative for depressed mood.         Pertinent review of systems per HPI    I reviewed the patient's chief complaint, history of present illness, review of systems, past medical history, surgical history, family history, social history, medications and allergy list in the EMR on 01/13/2025 and agree with the findings  "above.    Objective    Vital Signs:   Vitals:    01/13/25 1155   BP: 112/70   Weight: 80.7 kg (178 lb)   Height: 170.2 cm (67.01\")     BMI: Body mass index is 27.87 kg/m².     General Appearance: No acute distress. Alert and oriented.     Chest:  Non-labored breathing on room air      Ortho Exam:  Negative Tinel over the right cubital tunnel.  Ulnar nerve is palpated subcutaneously and is stable anterior to the medial epicondyle.  Incision is well-healed.  No intrinsic wasting or weakness in the right hand  Fingers warm and well-perfused distally  Sensation intact to light touch in the median, radial and ulnar nerve distributions    Imaging/Studies:   Imaging Results (Last 24 Hours)       Procedure Component Value Units Date/Time    XR Elbow 3+ View Right [[id number]] Resulted: 01/13/25 1224     Updated: 01/13/25 1224    Narrative:      Right Elbow X-Ray    Indication: Pain    Views: AP, oblique and Lateral     Comparison:  None    Findings:  No fracture  No bony lesion  Normal soft tissues  Mild degenerative changes    Impression: Mild degenerative change of the right elbow.  No acute bony   abnormality                        Procedures:  Procedures    Quality Measures:   ACP:   ACP discussion was deferred.    Tobacco:   Shawanda Jeronimo  reports that she has never smoked. She has been exposed to tobacco smoke. She has never used smokeless tobacco.    Assessment / Plan    Assessment/Plan:      Diagnosis Plan   1. Post-operative state  XR Elbow 3+ View Right      2. Cubital tunnel syndrome on right            Patient presents with right elbow pain after hitting her right elbow 1 week ago.  She does have an irritated ulnar nerve on exam however no evidence of subluxation of the ulnar nerve.  Recommend continue nerve gliding exercises and physical therapy.  Recommend right elbow compression pad for protection.  Recommend follow-up as scheduled at her next visit.    Follow Up:   No follow-ups on file.        Jack Gutierrez " MD Charline  [facility] [place] [facility] Upper Extremity Surgeon

## 2025-01-27 ENCOUNTER — TELEPHONE (OUTPATIENT)
Dept: ORTHOPEDIC SURGERY | Facility: CLINIC | Age: 60
End: 2025-01-27
Payer: MEDICARE

## 2025-01-27 NOTE — TELEPHONE ENCOUNTER
Patient called stating that her arm sleeve has ripped over the weekend and is needing a new one     Please advise, thank you

## 2025-01-27 NOTE — TELEPHONE ENCOUNTER
Spoke with the patient and let them know to stop by the clinic and we would get them a new elbow pad/sleeve.

## 2025-02-19 ENCOUNTER — OFFICE VISIT (OUTPATIENT)
Age: 60
End: 2025-02-19
Payer: MEDICARE

## 2025-02-19 VITALS
WEIGHT: 168.9 LBS | HEIGHT: 67 IN | DIASTOLIC BLOOD PRESSURE: 64 MMHG | SYSTOLIC BLOOD PRESSURE: 110 MMHG | BODY MASS INDEX: 26.51 KG/M2

## 2025-02-19 DIAGNOSIS — M25.511 RIGHT SHOULDER PAIN, UNSPECIFIED CHRONICITY: Primary | ICD-10-CM

## 2025-02-19 DIAGNOSIS — M75.81 RIGHT ROTATOR CUFF TENDINITIS: ICD-10-CM

## 2025-02-19 DIAGNOSIS — M19.011 ARTHRITIS OF RIGHT GLENOHUMERAL JOINT: ICD-10-CM

## 2025-02-19 RX ORDER — LOSARTAN POTASSIUM 50 MG/1
TABLET ORAL
COMMUNITY
Start: 2025-01-17
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
## 2025-02-20 ENCOUNTER — TELEPHONE (OUTPATIENT)
Dept: ORTHOPEDIC SURGERY | Facility: CLINIC | Age: 60
End: 2025-02-20
Payer: MEDICARE

## 2025-02-20 NOTE — TELEPHONE ENCOUNTER
Patient says that she is feeling a lot of pain on her elbow. If she moves her elbow a certain way or if she straightens it it begins to hurt a lot. And it feels numb.    Please advise.
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
## 2025-02-20 NOTE — TELEPHONE ENCOUNTER
Spoke with the patient and advised her the nerve pain is likely exacerbated by positioning from surgery. It should improve with time and rest. Dr. Olivier recommends to continue her therapy exercises as tolerated and continue to take her gabapentin, which should help with the nerve pain.

## 2025-03-17 ENCOUNTER — OFFICE VISIT (OUTPATIENT)
Age: 60
End: 2025-03-17
Payer: MEDICARE

## 2025-03-17 VITALS
DIASTOLIC BLOOD PRESSURE: 75 MMHG | HEIGHT: 67 IN | WEIGHT: 163.1 LBS | SYSTOLIC BLOOD PRESSURE: 122 MMHG | BODY MASS INDEX: 25.6 KG/M2

## 2025-03-17 DIAGNOSIS — M65.322 TRIGGER INDEX FINGER OF LEFT HAND: Primary | ICD-10-CM

## 2025-03-17 PROCEDURE — 1160F RVW MEDS BY RX/DR IN RCRD: CPT | Performed by: PLASTIC SURGERY

## 2025-03-17 PROCEDURE — 20550 NJX 1 TENDON SHEATH/LIGAMENT: CPT | Performed by: PLASTIC SURGERY

## 2025-03-17 PROCEDURE — 1159F MED LIST DOCD IN RCRD: CPT | Performed by: PLASTIC SURGERY

## 2025-03-17 PROCEDURE — 99213 OFFICE O/P EST LOW 20 MIN: CPT | Performed by: PLASTIC SURGERY

## 2025-03-17 RX ORDER — FLUCONAZOLE 150 MG/1
150 TABLET ORAL AS NEEDED
COMMUNITY
Start: 2025-03-05

## 2025-03-17 RX ORDER — TRIAMCINOLONE ACETONIDE 40 MG/ML
20 INJECTION, SUSPENSION INTRA-ARTICULAR; INTRAMUSCULAR
Status: COMPLETED | OUTPATIENT
Start: 2025-03-17 | End: 2025-03-17

## 2025-03-17 RX ORDER — LIDOCAINE HYDROCHLORIDE 10 MG/ML
0.5 INJECTION, SOLUTION INFILTRATION; PERINEURAL
Status: COMPLETED | OUTPATIENT
Start: 2025-03-17 | End: 2025-03-17

## 2025-03-17 RX ADMIN — TRIAMCINOLONE ACETONIDE 20 MG: 40 INJECTION, SUSPENSION INTRA-ARTICULAR; INTRAMUSCULAR at 09:17

## 2025-03-17 RX ADMIN — LIDOCAINE HYDROCHLORIDE 0.5 ML: 10 INJECTION, SOLUTION INFILTRATION; PERINEURAL at 09:17

## 2025-03-17 NOTE — PROGRESS NOTES
[facility]     Follow-up Office Visit       Date: 03/17/2025   Patient Name: Shawanda Jeronimo  MRN: [id number]  YOB: 1965    Chief Complaint:   Chief Complaint   Patient presents with    Follow-up     2 month recheck -  Right cubital tunnel decompression with anterior transposition; Right adipofascial flap/local tissue rearrangement for ulnar nerve transposition 15 cm^2 - 9/6/24   Left Carpal tunnel release 6/11/24       History of Present Illness:   Shawanda Jeronimo is a 59 y.o. female presents for follow-up.  She reports her right elbow has been doing better since her last visit.  Reports that her right elbow sensitivity has improved.  Reports right basilar thumb pain is stable.  She does report new complaint of left index finger pain.  She reports pain over the A1 pulley as well as a small tender mass over the dorsal aspect of the MCP.  Reports occasional catching and locking of the left index finger.  No other concerns.      Subjective   Review of Systems:   Review of Systems   Constitutional:  Negative for chills, fever, unexpected weight gain and unexpected weight loss.   HENT:  Negative for congestion, postnasal drip and rhinorrhea.    Eyes:  Negative for blurred vision.   Respiratory:  Negative for shortness of breath.    Cardiovascular:  Negative for leg swelling.   Gastrointestinal:  Negative for abdominal pain, nausea and vomiting.   Genitourinary:  Negative for difficulty urinating.   Musculoskeletal:  Positive for arthralgias. Negative for gait problem, joint swelling and myalgias.   Skin:  Negative for skin lesions and wound.   Neurological:  Negative for dizziness, weakness, light-headedness and numbness.   Hematological:  Does not bruise/bleed easily.   Psychiatric/Behavioral:  Negative for depressed mood.    All other systems reviewed and are negative.       Pertinent review of systems per HPI    I reviewed the  "patient's chief complaint, history of present illness, review of systems, past medical history, surgical history, family history, social history, medications and allergy list in the EMR on 03/17/2025 and agree with the findings above.    Objective    Vital Signs:   Vitals:    03/17/25 0902   BP: 122/75   Weight: 74 kg (163 lb 1.6 oz)   Height: 170.2 cm (67.01\")     BMI: Body mass index is 25.54 kg/m².     General Appearance: No acute distress. Alert and oriented.     Chest:  Non-labored breathing on room air      Ortho Exam:  Right elbow nontender palpation of the cubital tunnel.  Full flexion extension of the elbow with only mild pain in end flexion.  Tender palpation of the right thumb CMC with positive shuck test.  Negative grind test.  Tender palpation of the left index finger A1 pulley with a palpable nodule.  There is crepitus but no catching or locking with flexion.  There is a small 1 mm mass over the dorsal aspect of the MCP has firm but mobile.  Consistent with likely phlebolith.  Fingers warm and well-perfused distally  Sensation intact to light touch in the median, radial and ulnar nerve distributions    Imaging/Studies:   Imaging Results (Last 24 Hours)       ** No results found for the last 24 hours. **                Procedures:  Procedures    Quality Measures:   ACP:   ACP discussion was deferred.    Tobacco:   Shawanda Jeronimo  reports that she has never smoked. She has been exposed to tobacco smoke. She has never used smokeless tobacco.    Assessment / Plan    Assessment/Plan:      Diagnosis Plan   1. Trigger index finger of left hand            Presents for follow-up.  Her right cubital tunnel syndrome is much improved since her last visit and she has decreased elbow sensitivity and improved range of motion of her right elbow.  Right thumb CMC arthritis is stable.  She has a new complaint of left index trigger finger as well as left index finger small mass consistent with likely phlebolith.  Regarding " the trigger finger, recommend left index finger corticosteroid injection today.  Recommend continued observation of the phlebolith.  Recommend follow-up with me in 6 months    Procedure Note:    I discussed with the patient the potential benefits of performing therapeutic aspiration and injections as well as potential risks including but not limited to infection, swelling, pain, bleeding, bruising, nerve/vessel damage, skin color changes, transient elevation in blood glucose levels, and fat atrophy. After informed consent and after the areas were prepped with chlorhexadine soap, ethyl chloride was used to numb the skin. 0.5 mL of 1% lidocaine was injected followed by injection of 20mg of Kenalog into the A1 pulley of the left index finger.  The patient tolerated the procedure well. There were no complications. A sterile dressing was placed over the injection sites.      Follow Up:   Return in about 6 months (around 9/17/2025).        Jack Olivier MD  [facility] [place] Hand and Upper Extremity Surgeon

## 2025-03-17 NOTE — PROGRESS NOTES
Procedure   - Hand/Upper Extremity Injection: L index A1 for trigger finger on 3/17/2025 9:17 AM  Indications: pain  Details: 30 G needle, volar approach  Medications: 20 mg triamcinolone acetonide 40 MG/ML; 0.5 mL lidocaine 1 %  Outcome: tolerated well, no immediate complications  Procedure, treatment alternatives, risks and benefits explained, specific risks discussed. Consent was given by the patient. Immediately prior to procedure a time out was called to verify the correct patient, procedure, equipment, support staff and site/side marked as required. Patient was prepped and draped in the usual sterile fashion.

## 2025-03-31 ENCOUNTER — TRANSCRIBE ORDERS (OUTPATIENT)
Dept: ADMINISTRATIVE | Facility: HOSPITAL | Age: 60
End: 2025-03-31
Payer: MEDICARE

## 2025-03-31 DIAGNOSIS — Z12.31 VISIT FOR SCREENING MAMMOGRAM: Primary | ICD-10-CM

## 2025-04-01 LAB
NCCN CRITERIA FLAG: NORMAL
TYRER CUZICK SCORE: 11.1

## 2025-04-07 ENCOUNTER — PATIENT ROUNDING (BHMG ONLY) (OUTPATIENT)
Dept: URGENT CARE | Facility: CLINIC | Age: 60
End: 2025-04-07
Payer: MEDICARE

## 2025-04-09 ENCOUNTER — OFFICE VISIT (OUTPATIENT)
Dept: ORTHOPEDIC SURGERY | Facility: CLINIC | Age: 60
End: 2025-04-09
Payer: MEDICARE

## 2025-04-09 VITALS
DIASTOLIC BLOOD PRESSURE: 82 MMHG | SYSTOLIC BLOOD PRESSURE: 140 MMHG | WEIGHT: 166 LBS | HEIGHT: 67 IN | BODY MASS INDEX: 26.06 KG/M2

## 2025-04-09 DIAGNOSIS — M79.641 BILATERAL HAND PAIN: Primary | ICD-10-CM

## 2025-04-09 DIAGNOSIS — M79.642 BILATERAL HAND PAIN: Primary | ICD-10-CM

## 2025-04-09 RX ORDER — METHYLPREDNISOLONE 4 MG/1
1 TABLET ORAL DAILY
Qty: 21 TABLET | Refills: 0 | Status: SHIPPED | OUTPATIENT
Start: 2025-04-09

## 2025-04-09 NOTE — PROGRESS NOTES
[facility]     Follow-up Office Visit       Date: 04/09/2025   Patient Name: Shawanda Jeronimo  MRN: [id number]  YOB: 1965    Chief Complaint:   Chief Complaint   Patient presents with    Follow-up     3.5 week follow up---Right cubital tunnel decompression with anterior transposition; Right adipofascial flap/local tissue rearrangement for ulnar nerve transposition 15 cm^2 - 9/6/24   Left Carpal tunnel release 6/11/24       History of Present Illness:   Shawanda Jeronimo is a 59 y.o. female [place] for follow-up.  She complains of worsening bilateral basilar thumb pain that is worse with use and gripping.  She did work with hand therapy and is having trouble increasing strength due to pain.  She denies recent inciting trauma to her hands.  She does report left small and ring finger numbness and tingling that is intermittent and improves with elbow nerve gliding exercises.      Subjective   Review of Systems:   Review of Systems   Constitutional: Negative.  Negative for chills, fatigue and fever.   HENT: Negative.  Negative for congestion and dental problem.    Eyes: Negative.  Negative for blurred vision.   Respiratory: Negative.  Negative for shortness of breath.    Cardiovascular: Negative.  Negative for leg swelling.   Gastrointestinal: Negative.  Negative for abdominal pain.   Endocrine: Negative.  Negative for polyuria.   Genitourinary: Negative.  Negative for difficulty urinating.   Musculoskeletal:  Positive for arthralgias.   Skin: Negative.    Allergic/Immunologic: Negative.    Neurological: Negative.    Hematological: Negative.  Negative for adenopathy.   Psychiatric/Behavioral: Negative.  Negative for behavioral problems.         Pertinent review of systems per HPI    I reviewed the patient's chief complaint, history of present illness, review of systems, past medical history, surgical history, family history, social  "history, medications and allergy list in the EMR on 04/09/2025 and agree with the findings above.    Objective    Vital Signs:   Vitals:    04/09/25 1355   BP: 140/82   Weight: 75.3 kg (166 lb)   Height: 170.2 cm (67.01\")     BMI: Body mass index is 25.99 kg/m².     General Appearance: No acute distress. Alert and oriented.     Chest:  Non-labored breathing on room air      Ortho Exam:  Tender palpation of the bilateral thumb CMC's.  Positive shuck test.  Negative grind test.  Nontender over the bilateral radial styloids.  Negative Finkelstein's bilaterally.  Minimally positive Tinel at the left cubital tunnel.  Negative left elbow flexion compression test.  Fingers warm and well-perfused distally  Sensation intact to light touch in the median, radial and ulnar nerve distributions    Imaging/Studies:   Imaging Results (Last 24 Hours)       ** No results found for the last 24 hours. **                Procedures:  Procedures    Quality Measures:   ACP:   ACP discussion was deferred.    Tobacco:   Shawanda Jeronimo  reports that she has never smoked. She has been exposed to tobacco smoke. She has never used smokeless tobacco.    Assessment / Plan    Assessment/Plan:      Diagnosis Plan   1. Bilateral hand pain            Patient presents for follow-up of bilateral thumb CMC pain.  He has previously had corticosteroid injection with minimal improvement her pain.  She has evidence of only mild arthritis on x-ray given her chronic pain history I do not think she would be a good candidate for surgery.  Recommend bilateral Comfort Cool wrist braces.  Recommend Medrol Dosepak.  Recommend patient follow-up in 3 months.    Follow Up:   Return in about 3 months (around 7/9/2025).        Jack Olivier MD  [facility] Hand and Upper Extremity Surgeon  "

## 2025-04-16 ENCOUNTER — OFFICE VISIT (OUTPATIENT)
Age: 60
End: 2025-04-16
Payer: MEDICARE

## 2025-04-16 VITALS
DIASTOLIC BLOOD PRESSURE: 70 MMHG | HEIGHT: 67 IN | WEIGHT: 166.01 LBS | SYSTOLIC BLOOD PRESSURE: 110 MMHG | BODY MASS INDEX: 26.06 KG/M2

## 2025-04-16 DIAGNOSIS — M79.18 CERVICAL MYOFASCIAL PAIN SYNDROME: ICD-10-CM

## 2025-04-16 DIAGNOSIS — M19.011 ARTHRITIS OF RIGHT GLENOHUMERAL JOINT: Primary | ICD-10-CM

## 2025-04-16 RX ORDER — CYCLOBENZAPRINE HCL 5 MG
5 TABLET ORAL 3 TIMES DAILY PRN
Qty: 30 TABLET | Refills: 0 | Status: SHIPPED | OUTPATIENT
Start: 2025-04-16

## 2025-04-16 RX ORDER — NITROFURANTOIN 25; 75 MG/1; MG/1
CAPSULE ORAL
COMMUNITY
Start: 2025-04-14

## 2025-04-16 NOTE — PROGRESS NOTES
[facility] Follow Up Visit     Office Follow Up      Date: 04/16/2025   Patient Name: Shawanda Jeronimo  MRN: [id number]  YOB: 1965    Referring Physician: No ref. provider found     Chief Complaint:   Chief Complaint   Patient presents with    Follow-up     2 month follow up-  Arthritis of right glenohumeral joint       History of Present Illness: Shawanda Jeronimo is a 59 y.o. female who returns to clinic today for follow up on right shoulder pain. Her pain is a 7 /10 on the pain scale. Patient has tried the following previous treatments physical therapy  and tylenol/ tramadol. She mentions current symptoms of same as prior . She states that these treatments have stayed the same.      Subjective     Review of Systems: Review of Systems   Constitutional:  Negative for chills, fever, unexpected weight gain and unexpected weight loss.   HENT:  Negative for congestion, postnasal drip and rhinorrhea.    Eyes:  Negative for blurred vision.   Respiratory:  Negative for shortness of breath.    Cardiovascular:  Negative for leg swelling.   Gastrointestinal:  Negative for abdominal pain, nausea and vomiting.   Genitourinary:  Negative for difficulty urinating.   Musculoskeletal:  Positive for arthralgias. Negative for gait problem, joint swelling and myalgias.   Skin:  Negative for skin lesions and wound.   Neurological:  Negative for dizziness, weakness, light-headedness and numbness.   Hematological:  Does not bruise/bleed easily.   Psychiatric/Behavioral:  Negative for depressed mood.         Medications:   Current Outpatient Medications:     ALPRAZolam (XANAX) 1 MG tablet, Take 1 tablet by mouth 3 (Three) Times a Day As Needed., Disp: , Rfl:     cholecalciferol 25 MCG (1000 UT) tablet, Take  by mouth Daily., Disp: , Rfl:     estradiol (ESTRACE VAGINAL) 0.1 MG/GM vaginal cream, Insert 1 gm intravaginally 1-3 times each week, Disp: 1 each, Rfl: 12     famotidine (PEPCID) 40 MG tablet, Take 1 tablet by mouth 2 (Two) Times a Day., Disp: , Rfl:     fenofibrate (TRICOR) 48 MG tablet, , Disp: , Rfl:     gabapentin (NEURONTIN) 800 MG tablet, Take 1 tablet by mouth 3 (Three) Times a Day., Disp: , Rfl:     HYDROcodone-acetaminophen (NORCO)  MG per tablet, Take 1 tablet by mouth 3 (Three) Times a Day., Disp: , Rfl:     Insulin Pen Needle (B-D ULTRAFINE III SHORT PEN) 31G X 8 MM misc, Use., Disp: , Rfl:     Lantus SoloStar 100 UNIT/ML injection pen, Inject  under the skin into the appropriate area as directed., Disp: , Rfl:     loratadine (CLARITIN) 10 MG tablet, Take 1 tablet by mouth Daily., Disp: , Rfl:     losartan (COZAAR) 50 MG tablet, , Disp: , Rfl:     Meth-Hyo-M Bl-Na Phos-Ph Sal (Uro-MP) 118 MG capsule, , Disp: , Rfl:     methylPREDNISolone (MEDROL) 4 MG dose pack, Take 1 tablet by mouth Daily. Use as directed by package instructions, Disp: 21 tablet, Rfl: 0    nitrofurantoin, macrocrystal-monohydrate, (MACROBID) 100 MG capsule, , Disp: , Rfl:     promethazine (PHENERGAN) 25 MG tablet, , Disp: , Rfl:     rosuvastatin (CRESTOR) 5 MG tablet, , Disp: , Rfl:     traMADol (ULTRAM) 50 MG tablet, Take 1 tablet by mouth Every 8 (Eight) Hours As Needed., Disp: , Rfl:     cyclobenzaprine (FLEXERIL) 5 MG tablet, Take 1 tablet by mouth 3 (Three) Times a Day As Needed for Muscle Spasms., Disp: 30 tablet, Rfl: 0    Allergies:   Allergies   Allergen Reactions    Bactrim [Sulfamethoxazole-Trimethoprim] Itching    Darvon [Propoxyphene] Itching    Ibuprofen Rash    Levaquin [Levofloxacin] Rash    Morphine Rash     Eyes rolled back in her head       I have reviewed and updated the patient's chief complaint, history of present illness, review of systems, past medical history, surgical history, family history, social history, medications and allergy list as appropriate.     Objective      Vital Signs:   Vitals:    04/16/25 1359   BP: 110/70   Weight: 75.3 kg (166 lb 0.1 oz)  "  Height: 170.2 cm (67.01\")     Body mass index is 25.99 kg/m².  BMI is >= 25 and <30. (Overweight) The following options were offered after discussion;: exercise counseling/recommendations and nutrition counseling/recommendations      Patient reports that she is a non-smoker and has not ever been a smoker.  This behavior was applauded and she was encouraged to continue in smoking cessation.  We will continue to monitor at subsequent visits.     Ortho Exam:  Cardiovascular System: Arterial Pulses Right: radial normal. Arterial Pulses Left: radial normal.   C-Spine/Neck: Active Range of Motion: no crepitus or pain elicited on motion and flexion normal, extension normal, rotation normal, and lateral flexion normal.   Shoulders: Inspection Right: no misalignment, erythema, induration, swelling, or warmth and AC prominence normal. Inspection Left: no misalignment, erythema, induration, swelling, or warmth and AC prominence normal. Bony Palpation Right: tenderness of the acromioclavicular joint, the greater tuberosity, and the bicipital groove and no tenderness of the clavicle. Soft Tissue Palpation Right: tenderness of the supraspinatus, the infraspinatus, the glenohumeral joint region, and the lateral cuff insertion. Active Range of Motion Right: limited. Special Tests Right: Hawkin's test positive and empty can sign positive.   exam RIGHT shoulder: forward flexion approximately 80 degrees. Abduction 60 degrees. Internal rotation GT. Motor testing supraspinatus 4/5  exam LEFT shoulder: forward flexion approximately 140 degrees. Abduction 140 degrees. Internal rotation L1. Motor testing supraspinatus 5/5    Results Review:   Imaging Results (Last 24 Hours)       ** No results found for the last 24 hours. **            Procedures    Assessment / Plan      Assessment/Plan:   Diagnoses and all orders for this visit:    1. Arthritis of right glenohumeral joint (Primary)    2. Cervical myofascial pain syndrome  -     " cyclobenzaprine (FLEXERIL) 5 MG tablet; Take 1 tablet by mouth 3 (Three) Times a Day As Needed for Muscle Spasms.  Dispense: 30 tablet; Refill: 0    Plan:  Persistent pain with motion of the shoulder. New pain in posterior shoulder. D/C PT for shoulder to work on the elbow. Needs to continue PT.  Start muscle relaxer to help with pain, sleep.  Follow up if not improved once PT completed.    Follow Up:   No follow-ups on file.      Jamal Torres MD  [facility]

## 2025-04-22 DIAGNOSIS — N89.8 VAGINAL IRRITATION: ICD-10-CM

## 2025-04-22 RX ORDER — ESTRADIOL 0.1 MG/G
CREAM VAGINAL
Qty: 42.5 G | Refills: 3 | Status: SHIPPED | OUTPATIENT
Start: 2025-04-22

## 2025-04-22 NOTE — TELEPHONE ENCOUNTER
Pt informed that her cream was called in as she requested. She demonstrates understanding and appreciation.

## 2025-05-05 ENCOUNTER — OFFICE VISIT (OUTPATIENT)
Age: 60
End: 2025-05-05
Payer: MEDICARE

## 2025-05-05 VITALS
WEIGHT: 166 LBS | BODY MASS INDEX: 26.06 KG/M2 | DIASTOLIC BLOOD PRESSURE: 64 MMHG | SYSTOLIC BLOOD PRESSURE: 102 MMHG | HEIGHT: 67 IN

## 2025-05-05 DIAGNOSIS — M79.641 RIGHT HAND PAIN: Primary | ICD-10-CM

## 2025-05-05 PROCEDURE — 1159F MED LIST DOCD IN RCRD: CPT | Performed by: PLASTIC SURGERY

## 2025-05-05 PROCEDURE — 99213 OFFICE O/P EST LOW 20 MIN: CPT | Performed by: PLASTIC SURGERY

## 2025-05-05 PROCEDURE — 1160F RVW MEDS BY RX/DR IN RCRD: CPT | Performed by: PLASTIC SURGERY
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
## 2025-05-13 RX ORDER — METHYLPREDNISOLONE 4 MG/1
1 TABLET ORAL DAILY
Qty: 21 TABLET | Refills: 0 | Status: SHIPPED | OUTPATIENT
Start: 2025-05-13

## 2025-07-14 ENCOUNTER — OFFICE VISIT (OUTPATIENT)
Age: 60
End: 2025-07-14
Payer: MEDICARE

## 2025-07-14 VITALS
WEIGHT: 153.4 LBS | DIASTOLIC BLOOD PRESSURE: 78 MMHG | SYSTOLIC BLOOD PRESSURE: 118 MMHG | BODY MASS INDEX: 24.08 KG/M2 | HEIGHT: 67 IN

## 2025-07-14 DIAGNOSIS — M18.12 ARTHRITIS OF CARPOMETACARPAL (CMC) JOINT OF LEFT THUMB: Primary | ICD-10-CM

## 2025-07-14 DIAGNOSIS — M18.11 ARTHRITIS OF CARPOMETACARPAL (CMC) JOINT OF RIGHT THUMB: ICD-10-CM

## 2025-07-14 DIAGNOSIS — G56.23 CUBITAL TUNNEL SYNDROME OF BOTH UPPER EXTREMITIES: ICD-10-CM

## 2025-07-14 NOTE — PROGRESS NOTES
[facility]     Follow-up Office Visit       Date: 07/14/2025   Patient Name: Shawanda Jeronimo  MRN: [id number]  YOB: 1965    Chief Complaint:   Chief Complaint   Patient presents with   • Follow-up     Right hand pain       History of Present Illness:   Shawanda Jeronimo is a 60 y.o. female presents for follow-up of bilateral hand pain and bilateral cubital tunnel syndrome.  Patient reports her right elbow has been doing well since her last visit.  She does report worsening bilateral basilar thumb pain.  She also reports worsening left medial elbow pain that is exacerbated by holding her cell phone or driving.  She continues to work with physical therapy.  No other concerns at this time.      Subjective   Review of Systems:   Review of Systems   Constitutional: Negative.  Negative for chills, fatigue and fever.   HENT: Negative.  Negative for congestion and dental problem.    Eyes: Negative.  Negative for blurred vision.   Respiratory: Negative.  Negative for shortness of breath.    Cardiovascular: Negative.  Negative for leg swelling.   Gastrointestinal: Negative.  Negative for abdominal pain.   Endocrine: Negative.  Negative for polyuria.   Genitourinary: Negative.  Negative for difficulty urinating.   Musculoskeletal:  Positive for arthralgias.   Skin: Negative.    Allergic/Immunologic: Negative.    Neurological: Negative.    Hematological: Negative.  Negative for adenopathy.   Psychiatric/Behavioral: Negative.  Negative for behavioral problems.         Pertinent review of systems per HPI    I reviewed the patient's chief complaint, history of present illness, review of systems, past medical history, surgical history, family history, social history, medications and allergy list in the EMR on 07/14/2025 and agree with the findings above.    Objective    Vital Signs:   Vitals:    07/14/25 0827   BP: 118/78   Weight: 69.6 kg (153 lb  "6.4 oz)   Height: 170.2 cm (67\")     BMI: Body mass index is 24.03 kg/m².     General Appearance: No acute distress. Alert and oriented.     Chest:  Non-labored breathing on room air      Ortho Exam:  Right cubital tunnel incision well-healed.  Minimally tender palpation right medial elbow and medial epicondyle.  Persistent paresthesias in the right small finger.  5 out of 5 strength ulnar intrinsics.  Positive Tinel left cubital tunnel.  Minimally positive left elbow flexion compression test.  5 out of 5 strength ulnar intrinsics.  Intact sensation to light touch in the ulnar nerve distribution on the left.  Tender palpation of the bilateral thumb CMC's.  Positive shuck test.  Negative grind test.  Negative Finkelstein's bilaterally.  Fingers warm and well-perfused distally  Sensation intact to light touch in the median, radial and ulnar nerve distributions    Imaging/Studies:   Imaging Results (Last 24 Hours)       ** No results found for the last 24 hours. **                Procedures:  Procedures    Quality Measures:   ACP:   ACP discussion was deferred.    Tobacco:   Shawanda ROSS Phani  reports that she has never smoked. She has been exposed to tobacco smoke. She has never used smokeless tobacco.    Assessment / Plan    Assessment/Plan:      Diagnosis Plan   1. Arthritis of carpometacarpal (CMC) joint of left thumb        2. Arthritis of carpometacarpal (CMC) joint of right thumb        3. Cubital tunnel syndrome of both upper extremities  Ambulatory Referral to Physical Therapy for Evaluation & Treatment          Patient presents for follow-up of bilateral upper extremity pain.  She has persistent bilateral basilar thumb pain from her CMC arthritis.  We again reviewed her diagnosis was treatment option and bracing anti-inflammatories physical therapy and corticosteroid injection as well as CMC arthroplasty.  Patient like to continue with conservative care with bracing as needed and physical therapy.    She has " improvement in her right postoperative cubital tunnel syndrome symptoms with physical therapy.  Recommend ongoing physical therapy for right elbow nerve gliding exercises.  She has worsening of her left cubital tunnel syndrome.  We again reviewed her diagnosis as well as treatment options including physical therapy, bracing and cubital tunnel release.  Recommend continued physical therapy for left cubital tunnel nerve gliding exercises.  Follow-up as scheduled in September.    Follow Up:   No follow-ups on file.        Jack Olivier MD  [facility] [place] [facility]

## 2025-07-17 ENCOUNTER — TELEPHONE (OUTPATIENT)
Dept: ORTHOPEDIC SURGERY | Facility: CLINIC | Age: 60
End: 2025-07-17
Payer: MEDICARE

## 2025-07-21 ENCOUNTER — OFFICE VISIT (OUTPATIENT)
Age: 60
End: 2025-07-21
Payer: MEDICARE

## 2025-07-21 VITALS
SYSTOLIC BLOOD PRESSURE: 142 MMHG | HEIGHT: 67 IN | DIASTOLIC BLOOD PRESSURE: 75 MMHG | BODY MASS INDEX: 24.01 KG/M2 | WEIGHT: 153 LBS

## 2025-07-21 DIAGNOSIS — M65.331 TRIGGER MIDDLE FINGER OF RIGHT HAND: ICD-10-CM

## 2025-07-21 DIAGNOSIS — M65.321 TRIGGER INDEX FINGER OF RIGHT HAND: Primary | ICD-10-CM

## 2025-07-21 PROCEDURE — 99213 OFFICE O/P EST LOW 20 MIN: CPT | Performed by: PLASTIC SURGERY

## 2025-07-21 PROCEDURE — 1160F RVW MEDS BY RX/DR IN RCRD: CPT | Performed by: PLASTIC SURGERY

## 2025-07-21 PROCEDURE — 20550 NJX 1 TENDON SHEATH/LIGAMENT: CPT | Performed by: PLASTIC SURGERY

## 2025-07-21 PROCEDURE — 1159F MED LIST DOCD IN RCRD: CPT | Performed by: PLASTIC SURGERY

## 2025-07-21 RX ORDER — LIDOCAINE HYDROCHLORIDE 10 MG/ML
0.5 INJECTION, SOLUTION INFILTRATION; PERINEURAL
Status: COMPLETED | OUTPATIENT
Start: 2025-07-21 | End: 2025-07-21

## 2025-07-21 RX ORDER — DEXAMETHASONE SODIUM PHOSPHATE 4 MG/ML
2 INJECTION, SOLUTION INTRA-ARTICULAR; INTRALESIONAL; INTRAMUSCULAR; INTRAVENOUS; SOFT TISSUE
Status: COMPLETED | OUTPATIENT
Start: 2025-07-21 | End: 2025-07-21

## 2025-07-21 RX ADMIN — LIDOCAINE HYDROCHLORIDE 0.5 ML: 10 INJECTION, SOLUTION INFILTRATION; PERINEURAL at 09:02

## 2025-07-21 RX ADMIN — DEXAMETHASONE SODIUM PHOSPHATE 2 MG: 4 INJECTION, SOLUTION INTRA-ARTICULAR; INTRALESIONAL; INTRAMUSCULAR; INTRAVENOUS; SOFT TISSUE at 09:02

## 2025-07-21 NOTE — PROGRESS NOTES
[facility]     Follow-up Office Visit       Date: 07/21/2025   Patient Name: Shawanda Jeronimo  MRN: [id number]  YOB: 1965    Chief Complaint:   Chief Complaint   Patient presents with    Follow-up     1 Week Follow Up - Arthritis of carpometacarpal (CMC) joint of left and Right thumb       History of Present Illness:   Shawanda Jeronimo is a 60 y.o. female presents for follow-up.  Patient reports worsening right hand and thumb pain since her last visit 1 week ago.  Reports primary pain at the right index and middle finger.  Reports pain with flexion of the fingers.  Reports occasional catching and locking.  She has had previous trigger finger injection.  Reports pain is a 6 out of 10.      Subjective   Review of Systems:   Review of Systems   Constitutional: Negative.  Negative for chills, fatigue and fever.   HENT: Negative.  Negative for congestion and dental problem.    Eyes: Negative.  Negative for blurred vision.   Respiratory: Negative.  Negative for shortness of breath.    Cardiovascular: Negative.  Negative for leg swelling.   Gastrointestinal: Negative.  Negative for abdominal pain.   Endocrine: Negative.  Negative for polyuria.   Genitourinary: Negative.  Negative for difficulty urinating.   Musculoskeletal:  Positive for arthralgias.   Skin: Negative.    Allergic/Immunologic: Negative.    Neurological: Negative.    Hematological: Negative.  Negative for adenopathy.   Psychiatric/Behavioral: Negative.  Negative for behavioral problems.    All other systems reviewed and are negative.       Pertinent review of systems per HPI    I reviewed the patient's chief complaint, history of present illness, review of systems, past medical history, surgical history, family history, social history, medications and allergy list in the EMR on 07/21/2025 and agree with the findings above.    Objective    Vital Signs:   Vitals:    07/21/25  "0848   BP: 142/75   BP Location: Left arm   Patient Position: Sitting   Weight: 69.4 kg (153 lb)   Height: 170.2 cm (67.01\")     BMI: Body mass index is 23.96 kg/m².     General Appearance: No acute distress. Alert and oriented.     Chest:  Non-labored breathing on room air      Ortho Exam:  Inflammation of the right index middle finger A1 pulleys with palpable nodules.  No catching or locking with flexion.  Mildly tender to palpation of the right thumb CMC.  Mildly positive CMC shuck test.  Negative grind test.  Fingers warm and well-perfused distally  Sensation intact to light touch in the median, radial and ulnar nerve distributions    Imaging/Studies:   Imaging Results (Last 24 Hours)       ** No results found for the last 24 hours. **                Procedures:  Procedures    Quality Measures:   ACP:   ACP discussion was deferred.    Tobacco:   Shawanda Jeronimo  reports that she has never smoked. She has been exposed to tobacco smoke. She has never used smokeless tobacco.    Assessment / Plan    Assessment/Plan:      Diagnosis Plan   1. Trigger index finger of right hand        2. Trigger middle finger of right hand            Patient presents for follow-up of right hand pain.  Her biggest complaint at this time as well as her right index and middle finger trigger fingers.  We again reviewed the diagnosis of trigger finger as well as treatment options, bracing corticosteroid injection and surgery.  Recommend right index middle finger trigger finger injection today.  Recommend follow-up as needed.    Procedure Note:    I discussed with the patient the potential benefits of performing therapeutic aspiration and injections as well as potential risks including but not limited to infection, swelling, pain, bleeding, bruising, nerve/vessel damage, skin color changes, transient elevation in blood glucose levels, and fat atrophy. After informed consent and after the areas were prepped with chlorhexadine soap, ethyl " chloride was used to numb the skin. 0.5 mL of 1% lidocaine was injected followed by injection of 2 mg of dexamethasone into the A1 pulley of the right index and middle fingers.  The patient tolerated the procedure well. There were no complications. A sterile dressing was placed over the injection sites.      Follow Up:   No follow-ups on file.        Jack Olivier MD  [facility] [place] [facility]

## 2025-07-21 NOTE — PROGRESS NOTES
Procedure   - Hand/Upper Extremity Injection: R index A1 for trigger finger on 7/21/2025 9:02 AM  Indications: pain  Details: 30 G needle, volar approach  Medications: 2 mg dexAMETHasone 4 MG/ML; 0.5 mL lidocaine 1 %  Outcome: tolerated well, no immediate complications  Procedure, treatment alternatives, risks and benefits explained, specific risks discussed. Consent was given by the patient. Immediately prior to procedure a time out was called to verify the correct patient, procedure, equipment, support staff and site/side marked as required. Patient was prepped and draped in the usual sterile fashion.

## 2025-07-21 NOTE — PROGRESS NOTES
Procedure   - Hand/Upper Extremity Injection: R long A1 for trigger finger on 7/21/2025 9:02 AM  Indications: pain  Details: 30 G needle, volar approach  Medications: 2 mg dexAMETHasone 4 MG/ML; 0.5 mL lidocaine 1 %  Outcome: tolerated well, no immediate complications  Procedure, treatment alternatives, risks and benefits explained, specific risks discussed. Consent was given by the patient. Immediately prior to procedure a time out was called to verify the correct patient, procedure, equipment, support staff and site/side marked as required. Patient was prepped and draped in the usual sterile fashion.

## 2025-07-25 ENCOUNTER — TELEPHONE (OUTPATIENT)
Dept: ORTHOPEDIC SURGERY | Facility: CLINIC | Age: 60
End: 2025-07-25
Payer: MEDICARE

## 2025-07-25 NOTE — TELEPHONE ENCOUNTER
I spoke with the patient to let her know it can take up to 2 weeks for the injections to fully kick in and start working fully. I let her know to give it the full 2 weeks and then if she is still in pain she can call and we can bring her back in for an appointment to see what the next steps would be. The patient verbalized understanding and will call when she needs us.     Tayla Turner

## 2025-07-25 NOTE — TELEPHONE ENCOUNTER
PATIENT SAW DR. ARGUETA ON 07/21 AND HAD CORTISONE INJECTIONS IN HER RIGHT HAND. HER RIGHT HAND HAS BEEN IN A LOT OF PAIN TODAY AND IT BURNS. SHE ALSO STATES THAT HER LEFT HAND HAS BEEN HURTING AND SHE CAN'T  ANYTHING WITH THE THUMB. SHE FORGOT TO MENTION HER LEFT THUMB TO DR. ARGUETA WHEN SHE WAS HERE LAST.  SHE HAS TRIED TYLENOL AND ICING.     IF DR. ARGUETA OR CLINICAL STAFF COULD PLEASE ADVISE.     CALL BACK # [phone]

## 2025-07-29 ENCOUNTER — TELEPHONE (OUTPATIENT)
Dept: ORTHOPEDIC SURGERY | Facility: CLINIC | Age: 60
End: 2025-07-29
Payer: MEDICARE

## 2025-07-29 NOTE — TELEPHONE ENCOUNTER
Macho Redd   MRN: [id number]    Department:  THE [facility] Emergency Department in [place]   Date of Visit:  6/3/2019           Disclosure     Insurance plans vary and the physician(s) referred by the ER may not be covered by your plan.  Please contact PATIENT RETURNED SAMIR'S CALL.  SAMIR WAS UNAVAILABLE TO TAKE THE CALL.  I OFFERED TO SEND A MESSAGE TO SAMIR BUT THE PATIENT STATED SHE WILL CALL BACK AFTER PHYSICAL THERAPY.     tell this physician (or your personal doctor if your instructions are to return to your personal doctor) about any new or lasting problems. The primary care or specialist physician will see patients referred from the [facility] Emergency Department.  Angel Paige

## 2025-07-29 NOTE — TELEPHONE ENCOUNTER
I called and spoke with Ms. Jeronimo she stated that her elbow locked up on her on Friday but she was able to get it moving.  On Sunday her elbow locked again and was locked until she went to therapy today.  She is not aware of anything that caused it.  She mopped the floor over the weekend but had to stop because it starting hurting.  She rates her pain a 6-7.  She can straighten her arm out all the way but states that it hurts really bad.  PT told her not to force it.  She is concerned that it is going to lock up again.      She is also wanting an injection in her thumb.  That is the thumb she uses to do her injection.  Would that be beneficial?    Please advise. Thanks. Macey

## 2025-07-29 NOTE — TELEPHONE ENCOUNTER
Patient says her right elbow has locked up on her and she can't straighten it back.     Please advise.

## 2025-07-29 NOTE — TELEPHONE ENCOUNTER
I called and scheduled her an appointment with Dr. Olivier on Thursday at the [facility] at 1:40.  Macey

## 2025-07-29 NOTE — TELEPHONE ENCOUNTER
I would suggest she come in and see Dr. Olivier.  If is her left thumb, it was injection back in 12/24.

## 2025-07-29 NOTE — TELEPHONE ENCOUNTER
Patient completed therapy and was calling back to speak with Macey. Macey was unavailable. Patient stated she would wait for a return call    Patient wishes to schedule US ankle/brachial exam for 9/14/17. Current order expires prior to this date. Please extend order. Time on hold.    Thank you

## 2025-07-31 ENCOUNTER — OFFICE VISIT (OUTPATIENT)
Age: 60
End: 2025-07-31
Payer: MEDICARE

## 2025-07-31 VITALS
HEIGHT: 67 IN | WEIGHT: 153 LBS | SYSTOLIC BLOOD PRESSURE: 118 MMHG | DIASTOLIC BLOOD PRESSURE: 70 MMHG | BODY MASS INDEX: 24.01 KG/M2

## 2025-07-31 DIAGNOSIS — M79.601 CHRONIC PAIN OF RIGHT UPPER EXTREMITY: ICD-10-CM

## 2025-07-31 DIAGNOSIS — M65.331 TRIGGER MIDDLE FINGER OF RIGHT HAND: Primary | ICD-10-CM

## 2025-07-31 DIAGNOSIS — G89.29 CHRONIC PAIN OF RIGHT UPPER EXTREMITY: ICD-10-CM

## 2025-07-31 DIAGNOSIS — M18.11 ARTHRITIS OF CARPOMETACARPAL (CMC) JOINT OF RIGHT THUMB: ICD-10-CM

## 2025-07-31 NOTE — PROGRESS NOTES
"                                                                 [facility]     Follow-up Office Visit       Date: 07/31/2025   Patient Name: Shawanda Jeronimo  MRN: [id number]  YOB: 1965    Chief Complaint:   Chief Complaint   Patient presents with    Follow-up     1 week follow up right elbow pain        History of Present Illness:   Shawanda Jeronimo is a 60 y.o. female presents for follow-up of right upper extremity pain.  Underwent right middle finger trigger finger injection at her last visit.  Reports she continues to have pain as well as catching and locking.  She also reports ongoing right elbow pain and soreness.  Her right basilar thumb pain has been worsening lately as well.      Subjective   Review of Systems:   Review of Systems   Musculoskeletal:  Positive for arthralgias.   All other systems reviewed and are negative.       Pertinent review of systems per HPI    I reviewed the patient's chief complaint, history of present illness, review of systems, past medical history, surgical history, family history, social history, medications and allergy list in the EMR on 07/31/2025 and agree with the findings above.    Objective    Vital Signs:   Vitals:    07/31/25 1340   BP: 118/70   Weight: 69.4 kg (153 lb)   Height: 170.2 cm (67.01\")     BMI: Body mass index is 23.96 kg/m².     General Appearance: No acute distress. Alert and oriented.     Chest:  Non-labored breathing on room air      Ortho Exam:  Globally tender to palpation about the right hand forearm and elbow.  Tender to palpation of the medial epicondyle.  Tender palpation of the right thumb CMC.  Tender palpation of the index middle finger A1 pulleys.  There is no catching or locking with flexion of the index and middle fingers.  Mildly positive Tinel at the carpal and cubital tunnel.  Negative right elbow flexion compression test.  Fingers warm and well-perfused distally  Sensation intact to light touch in the median, radial " and ulnar nerve distributions    Imaging/Studies:   Imaging Results (Last 24 Hours)       ** No results found for the last 24 hours. **              Procedures:  Procedures    Quality Measures:   ACP:   ACP discussion was deferred.    Tobacco:   Shawanda Jeronimo  reports that she has never smoked. She has been exposed to tobacco smoke. She has never used smokeless tobacco.    Assessment / Plan    Assessment/Plan:      Diagnosis Plan   1. Trigger middle finger of right hand        2. Arthritis of carpometacarpal (CMC) joint of right thumb        3. Chronic pain of right upper extremity  Ambulatory Referral to Pain Management          Patient presents for chronic right upper extremity pain.  She underwent middle and index finger trigger finger injection at her last visit 10 days ago.  She has not had relief of symptoms yet.  Discussed that it can take up to 2 weeks for these injections to work sometimes.  She also has ongoing chronic right medial elbow pain despite previous cubital tunnel release and anterior subcutaneous transposition.  Based on the patient's chronic refractory upper extremity pain and likely some component of fibromyalgia.  Recommend referral to pain management for evaluation.  Recommend continued observation for trigger fingers.  Recommend follow-up with me as scheduled in September.  Follow Up:   Return in about 6 weeks (around 9/11/2025).        Jack Olivier MD  [facility]

## 2025-08-25 ENCOUNTER — OFFICE VISIT (OUTPATIENT)
Age: 60
End: 2025-08-25
Payer: MEDICARE

## 2025-08-25 VITALS
HEIGHT: 67 IN | BODY MASS INDEX: 24.01 KG/M2 | SYSTOLIC BLOOD PRESSURE: 118 MMHG | WEIGHT: 153 LBS | DIASTOLIC BLOOD PRESSURE: 60 MMHG

## 2025-08-25 DIAGNOSIS — M19.011 ARTHRITIS OF RIGHT GLENOHUMERAL JOINT: Primary | ICD-10-CM

## 2025-08-25 PROCEDURE — 99213 OFFICE O/P EST LOW 20 MIN: CPT | Performed by: STUDENT IN AN ORGANIZED HEALTH CARE EDUCATION/TRAINING PROGRAM

## 2025-08-25 PROCEDURE — 1159F MED LIST DOCD IN RCRD: CPT | Performed by: STUDENT IN AN ORGANIZED HEALTH CARE EDUCATION/TRAINING PROGRAM

## 2025-08-25 PROCEDURE — 1160F RVW MEDS BY RX/DR IN RCRD: CPT | Performed by: STUDENT IN AN ORGANIZED HEALTH CARE EDUCATION/TRAINING PROGRAM

## 2025-08-25 RX ORDER — AMITRIPTYLINE HYDROCHLORIDE 75 MG/1
TABLET ORAL
COMMUNITY
Start: 2025-08-19

## (undated) DEVICE — CATH URETRL FLXITP POLLACK STD 5F 70CM

## (undated) DEVICE — BLANKT WARM UPPR/BDY ARM/OUT 57X196CM

## (undated) DEVICE — PK CYSTO-TUR BASIC 10

## (undated) DEVICE — NITINOL WIRE WITH HYDROPHILIC TIP: Brand: SENSOR

## (undated) DEVICE — GLV SURG SENSICARE PI MIC PF SZ7.5 LF STRL